# Patient Record
Sex: FEMALE | Race: BLACK OR AFRICAN AMERICAN | Employment: UNEMPLOYED | URBAN - METROPOLITAN AREA
[De-identification: names, ages, dates, MRNs, and addresses within clinical notes are randomized per-mention and may not be internally consistent; named-entity substitution may affect disease eponyms.]

---

## 2017-08-26 PROBLEM — R51.9 HEADACHE: Status: ACTIVE | Noted: 2017-08-26

## 2017-08-26 PROBLEM — M54.50 LOW BACK PAIN: Status: ACTIVE | Noted: 2017-08-26

## 2017-08-26 PROBLEM — R07.9 CHEST PAIN: Status: ACTIVE | Noted: 2017-08-26

## 2021-05-10 ENCOUNTER — OFFICE VISIT (OUTPATIENT)
Dept: SURGERY | Age: 56
End: 2021-05-10
Payer: MEDICARE

## 2021-05-10 VITALS
DIASTOLIC BLOOD PRESSURE: 66 MMHG | OXYGEN SATURATION: 100 % | HEART RATE: 72 BPM | TEMPERATURE: 98.1 F | RESPIRATION RATE: 16 BRPM | SYSTOLIC BLOOD PRESSURE: 107 MMHG | WEIGHT: 247.7 LBS | BODY MASS INDEX: 48.63 KG/M2 | HEIGHT: 60 IN

## 2021-05-10 DIAGNOSIS — E66.01 MORBID OBESITY WITH BMI OF 45.0-49.9, ADULT (HCC): ICD-10-CM

## 2021-05-10 DIAGNOSIS — E78.00 HIGH CHOLESTEROL: ICD-10-CM

## 2021-05-10 DIAGNOSIS — E55.9 VITAMIN D DEFICIENCY: ICD-10-CM

## 2021-05-10 DIAGNOSIS — Z98.84 S/P LAPAROSCOPIC SLEEVE GASTRECTOMY: ICD-10-CM

## 2021-05-10 DIAGNOSIS — E66.8 MODERATE OBESITY: ICD-10-CM

## 2021-05-10 DIAGNOSIS — G25.81 RLS (RESTLESS LEGS SYNDROME): ICD-10-CM

## 2021-05-10 DIAGNOSIS — J45.909 ASTHMA, UNSPECIFIED ASTHMA SEVERITY, UNSPECIFIED WHETHER COMPLICATED, UNSPECIFIED WHETHER PERSISTENT: ICD-10-CM

## 2021-05-10 DIAGNOSIS — M54.50 LOW BACK PAIN, UNSPECIFIED BACK PAIN LATERALITY, UNSPECIFIED CHRONICITY, UNSPECIFIED WHETHER SCIATICA PRESENT: ICD-10-CM

## 2021-05-10 DIAGNOSIS — G43.909 MIGRAINE WITHOUT STATUS MIGRAINOSUS, NOT INTRACTABLE, UNSPECIFIED MIGRAINE TYPE: ICD-10-CM

## 2021-05-10 DIAGNOSIS — I10 ESSENTIAL HYPERTENSION: ICD-10-CM

## 2021-05-10 DIAGNOSIS — K21.9 GASTROESOPHAGEAL REFLUX DISEASE, UNSPECIFIED WHETHER ESOPHAGITIS PRESENT: Primary | ICD-10-CM

## 2021-05-10 DIAGNOSIS — I50.9 HEART FAILURE, UNSPECIFIED HF CHRONICITY, UNSPECIFIED HEART FAILURE TYPE (HCC): ICD-10-CM

## 2021-05-10 DIAGNOSIS — K90.9 INTESTINAL MALABSORPTION, UNSPECIFIED TYPE: ICD-10-CM

## 2021-05-10 PROCEDURE — G8754 DIAS BP LESS 90: HCPCS | Performed by: SPECIALIST

## 2021-05-10 PROCEDURE — 99205 OFFICE O/P NEW HI 60 MIN: CPT | Performed by: SPECIALIST

## 2021-05-10 PROCEDURE — G8427 DOCREV CUR MEDS BY ELIG CLIN: HCPCS | Performed by: SPECIALIST

## 2021-05-10 PROCEDURE — 3017F COLORECTAL CA SCREEN DOC REV: CPT | Performed by: SPECIALIST

## 2021-05-10 PROCEDURE — G8752 SYS BP LESS 140: HCPCS | Performed by: SPECIALIST

## 2021-05-10 PROCEDURE — G8510 SCR DEP NEG, NO PLAN REQD: HCPCS | Performed by: SPECIALIST

## 2021-05-10 PROCEDURE — G8417 CALC BMI ABV UP PARAM F/U: HCPCS | Performed by: SPECIALIST

## 2021-05-10 RX ORDER — PANTOPRAZOLE SODIUM 40 MG/1
40 TABLET, DELAYED RELEASE ORAL 2 TIMES DAILY
COMMUNITY

## 2021-06-09 ENCOUNTER — APPOINTMENT (OUTPATIENT)
Dept: SURGERY | Age: 56
End: 2021-06-09

## 2021-06-09 ENCOUNTER — HOSPITAL ENCOUNTER (OUTPATIENT)
Age: 56
Setting detail: OUTPATIENT SURGERY
Discharge: HOME | End: 2021-06-09
Attending: SPECIALIST | Admitting: SPECIALIST
Payer: MEDICARE

## 2021-06-09 ENCOUNTER — APPOINTMENT (OUTPATIENT)
Dept: GENERAL RADIOLOGY | Age: 56
End: 2021-06-09
Attending: SPECIALIST
Payer: MEDICARE

## 2021-06-09 VITALS
TEMPERATURE: 97.8 F | SYSTOLIC BLOOD PRESSURE: 140 MMHG | HEIGHT: 61 IN | DIASTOLIC BLOOD PRESSURE: 70 MMHG | HEART RATE: 70 BPM | WEIGHT: 254.4 LBS | BODY MASS INDEX: 48.03 KG/M2 | RESPIRATION RATE: 18 BRPM | OXYGEN SATURATION: 100 %

## 2021-06-09 DIAGNOSIS — E66.01 MORBID OBESITY (HCC): ICD-10-CM

## 2021-06-09 DIAGNOSIS — K21.9 GASTROESOPHAGEAL REFLUX DISEASE, UNSPECIFIED WHETHER ESOPHAGITIS PRESENT: ICD-10-CM

## 2021-06-09 PROCEDURE — 74011000250 HC RX REV CODE- 250: Performed by: SPECIALIST

## 2021-06-09 PROCEDURE — 74240 X-RAY XM UPR GI TRC 1CNTRST: CPT

## 2021-06-09 PROCEDURE — 74240 X-RAY XM UPR GI TRC 1CNTRST: CPT | Performed by: SPECIALIST

## 2021-06-09 PROCEDURE — 76040000019: Performed by: SPECIALIST

## 2021-07-20 DIAGNOSIS — K21.9 GASTROESOPHAGEAL REFLUX DISEASE, UNSPECIFIED WHETHER ESOPHAGITIS PRESENT: Primary | ICD-10-CM

## 2021-07-20 DIAGNOSIS — Z98.84 BARIATRIC SURGERY STATUS: ICD-10-CM

## 2021-07-20 DIAGNOSIS — E66.01 MORBID OBESITY (HCC): ICD-10-CM

## 2021-07-20 DIAGNOSIS — Z01.812 BLOOD TESTS PRIOR TO TREATMENT OR PROCEDURE: ICD-10-CM

## 2021-08-04 ENCOUNTER — HOSPITAL ENCOUNTER (OUTPATIENT)
Dept: PREADMISSION TESTING | Age: 56
Discharge: HOME | End: 2021-08-04
Payer: MEDICARE

## 2021-08-04 DIAGNOSIS — E66.01 MORBID OBESITY (HCC): ICD-10-CM

## 2021-08-04 DIAGNOSIS — K21.9 GASTROESOPHAGEAL REFLUX DISEASE, UNSPECIFIED WHETHER ESOPHAGITIS PRESENT: ICD-10-CM

## 2021-08-04 DIAGNOSIS — Z98.84 BARIATRIC SURGERY STATUS: ICD-10-CM

## 2021-08-04 DIAGNOSIS — Z01.812 BLOOD TESTS PRIOR TO TREATMENT OR PROCEDURE: ICD-10-CM

## 2021-08-04 LAB
ALBUMIN SERPL-MCNC: 3.7 G/DL (ref 3.4–5)
ALBUMIN/GLOB SERPL: 1.2 {RATIO} (ref 0.8–1.7)
ALP SERPL-CCNC: 114 U/L (ref 45–117)
ALT SERPL-CCNC: 29 U/L (ref 13–56)
ANION GAP SERPL CALC-SCNC: 1 MMOL/L (ref 3–18)
AST SERPL-CCNC: 22 U/L (ref 10–38)
ATRIAL RATE: 71 BPM
BASOPHILS # BLD: 0 K/UL (ref 0–0.1)
BASOPHILS NFR BLD: 1 % (ref 0–2)
BILIRUB SERPL-MCNC: 0.3 MG/DL (ref 0.2–1)
BUN SERPL-MCNC: 13 MG/DL (ref 7–18)
BUN/CREAT SERPL: 13 (ref 12–20)
CALCIUM SERPL-MCNC: 8.9 MG/DL (ref 8.5–10.1)
CALCULATED P AXIS, ECG09: 82 DEGREES
CALCULATED R AXIS, ECG10: -48 DEGREES
CALCULATED T AXIS, ECG11: 72 DEGREES
CHLORIDE SERPL-SCNC: 111 MMOL/L (ref 100–111)
CO2 SERPL-SCNC: 31 MMOL/L (ref 21–32)
CREAT SERPL-MCNC: 1 MG/DL (ref 0.6–1.3)
DIAGNOSIS, 93000: NORMAL
DIFFERENTIAL METHOD BLD: NORMAL
EOSINOPHIL # BLD: 0.2 K/UL (ref 0–0.4)
EOSINOPHIL NFR BLD: 4 % (ref 0–5)
ERYTHROCYTE [DISTWIDTH] IN BLOOD BY AUTOMATED COUNT: 13 % (ref 11.6–14.5)
EST. AVERAGE GLUCOSE BLD GHB EST-MCNC: 137 MG/DL
GLOBULIN SER CALC-MCNC: 3.2 G/DL (ref 2–4)
GLUCOSE SERPL-MCNC: 89 MG/DL (ref 74–99)
HBA1C MFR BLD: 6.4 % (ref 4.2–5.6)
HCT VFR BLD AUTO: 38.3 % (ref 35–45)
HGB BLD-MCNC: 12 G/DL (ref 12–16)
LYMPHOCYTES # BLD: 2.2 K/UL (ref 0.9–3.6)
LYMPHOCYTES NFR BLD: 41 % (ref 21–52)
MCH RBC QN AUTO: 27.3 PG (ref 24–34)
MCHC RBC AUTO-ENTMCNC: 31.3 G/DL (ref 31–37)
MCV RBC AUTO: 87 FL (ref 74–97)
MONOCYTES # BLD: 0.4 K/UL (ref 0.05–1.2)
MONOCYTES NFR BLD: 8 % (ref 3–10)
NEUTS SEG # BLD: 2.5 K/UL (ref 1.8–8)
NEUTS SEG NFR BLD: 47 % (ref 40–73)
P-R INTERVAL, ECG05: 166 MS
PLATELET # BLD AUTO: 240 K/UL (ref 135–420)
PMV BLD AUTO: 10.4 FL (ref 9.2–11.8)
POTASSIUM SERPL-SCNC: 4.2 MMOL/L (ref 3.5–5.5)
PROT SERPL-MCNC: 6.9 G/DL (ref 6.4–8.2)
Q-T INTERVAL, ECG07: 432 MS
QRS DURATION, ECG06: 90 MS
QTC CALCULATION (BEZET), ECG08: 469 MS
RBC # BLD AUTO: 4.4 M/UL (ref 4.2–5.3)
SODIUM SERPL-SCNC: 143 MMOL/L (ref 136–145)
VENTRICULAR RATE, ECG03: 71 BPM
WBC # BLD AUTO: 5.4 K/UL (ref 4.6–13.2)

## 2021-08-04 PROCEDURE — 93005 ELECTROCARDIOGRAM TRACING: CPT

## 2021-08-04 PROCEDURE — 36415 COLL VENOUS BLD VENIPUNCTURE: CPT

## 2021-08-04 PROCEDURE — 80053 COMPREHEN METABOLIC PANEL: CPT

## 2021-08-04 PROCEDURE — 83036 HEMOGLOBIN GLYCOSYLATED A1C: CPT

## 2021-08-04 PROCEDURE — 85025 COMPLETE CBC W/AUTO DIFF WBC: CPT

## 2021-08-09 ENCOUNTER — TELEPHONE (OUTPATIENT)
Dept: SURGERY | Age: 56
End: 2021-08-09

## 2021-08-09 ENCOUNTER — OFFICE VISIT (OUTPATIENT)
Dept: SURGERY | Age: 56
End: 2021-08-09
Payer: MEDICARE

## 2021-08-09 VITALS
OXYGEN SATURATION: 99 % | HEIGHT: 61 IN | DIASTOLIC BLOOD PRESSURE: 58 MMHG | BODY MASS INDEX: 47.77 KG/M2 | RESPIRATION RATE: 16 BRPM | SYSTOLIC BLOOD PRESSURE: 108 MMHG | WEIGHT: 253 LBS | TEMPERATURE: 97.6 F | HEART RATE: 70 BPM

## 2021-08-09 DIAGNOSIS — G89.18 POST-OP PAIN: Primary | ICD-10-CM

## 2021-08-09 PROCEDURE — G8754 DIAS BP LESS 90: HCPCS | Performed by: SPECIALIST

## 2021-08-09 PROCEDURE — G8417 CALC BMI ABV UP PARAM F/U: HCPCS | Performed by: SPECIALIST

## 2021-08-09 PROCEDURE — G8752 SYS BP LESS 140: HCPCS | Performed by: SPECIALIST

## 2021-08-09 PROCEDURE — G8428 CUR MEDS NOT DOCUMENT: HCPCS | Performed by: SPECIALIST

## 2021-08-09 PROCEDURE — 99215 OFFICE O/P EST HI 40 MIN: CPT | Performed by: SPECIALIST

## 2021-08-09 PROCEDURE — G8510 SCR DEP NEG, NO PLAN REQD: HCPCS | Performed by: SPECIALIST

## 2021-08-09 PROCEDURE — 3017F COLORECTAL CA SCREEN DOC REV: CPT | Performed by: SPECIALIST

## 2021-08-09 RX ORDER — ENOXAPARIN SODIUM 100 MG/ML
40 INJECTION SUBCUTANEOUS EVERY 12 HOURS
Qty: 14 SYRINGE | Refills: 0 | Status: SHIPPED | OUTPATIENT
Start: 2021-08-09 | End: 2021-09-08 | Stop reason: ALTCHOICE

## 2021-08-09 RX ORDER — OXYCODONE AND ACETAMINOPHEN 5; 325 MG/1; MG/1
1 TABLET ORAL
Qty: 30 TABLET | Refills: 0 | Status: SHIPPED | OUTPATIENT
Start: 2021-08-09 | End: 2021-09-08 | Stop reason: ALTCHOICE

## 2021-08-09 RX ORDER — SCOLOPAMINE TRANSDERMAL SYSTEM 1 MG/1
1 PATCH, EXTENDED RELEASE TRANSDERMAL
Qty: 1 PATCH | Refills: 0 | Status: SHIPPED | OUTPATIENT
Start: 2021-08-09 | End: 2021-11-30 | Stop reason: ALTCHOICE

## 2021-08-09 NOTE — TELEPHONE ENCOUNTER
LVM to notify pt that Dr. Raul Nieves sent Rx for Patch to [facility] and pt can  any time before surgery

## 2021-08-09 NOTE — H&P (VIEW-ONLY)
Gastric Bypass - History and Physical    Subjective: The patient is a 60 y.o. obese female with a Body mass index is 47.8 kg/m². .   she presents now to review their work up to date to see if they are a candidate for surgery and whether or not to proceed with the previously requested procedure. Bariatric comorbidities continue to include:   Patient Active Problem List   Diagnosis Code    Low back pain M54.5    Chest pain R07.9    Headache R51.9    Hypertension I10    High cholesterol E78.00    Heart failure (HCC) I50.9    Diabetes ([address] E11.9    Asthma J45.909    Migraines G43.909    GERD (gastroesophageal reflux disease) K21.9    Vitamin D deficiency E55.9    Moderate obesity E66.8    Morbid obesity with BMI of 45.0-49.9, adult (HCC) E66.01, Z68.42    Intestinal malabsorption K90.9    S/P laparoscopic sleeve gastrectomy Z98.84    RLS (restless legs syndrome) G25.81    Morbid obesity ([facility]) E66.01    Morbid obesity with body mass index (BMI) of 40.0 to 49.9 ([facility]) E66.01    History of placement of internal cardiac defibrillator Z95.810       They have been generally well prior to this visit and have had no recent significant illnesses. The patient has had no gastrointestinal issues that would preclude them from proceeding with the surgery they have chosen. Nica Dacosta has recently tried a preoperative weight loss program  in addition to seeing a bariatric nutritionist preoperatively. We have discussed on at least one other occasion about the various types of surgical weight loss procedures and they have considered these options after our initial consultation. We have once again discussed these procedures in detail and they have now decided on a surgical procedure. They present today to discuss this and confirm that their evaluation pre operatively is acceptable to continue with surgery. The patient desires laparoscopic gastric bypass surgery for surgical weight loss.     The patients goal weight is 140lb. These goals are consistent with expected outcomes of their desired operation. her Medical goals are resolution of these health issues. Patient Active Problem List    Diagnosis Date Noted    Morbid obesity ([facility]     Morbid obesity with body mass index (BMI) of 40.0 to 49.9 ([facility]     History of placement of internal cardiac defibrillator     Intestinal malabsorption 05/10/2021    S/P laparoscopic sleeve gastrectomy 05/10/2021    Hypertension     High cholesterol     Diabetes ([facility]     Asthma     Migraines     GERD (gastroesophageal reflux disease)     Vitamin D deficiency     Moderate obesity     Morbid obesity with BMI of 45.0-49.9, adult (HCC)     RLS (restless legs syndrome)     Low back pain 08/26/2017    Chest pain 08/26/2017    Headache 08/26/2017    Heart failure ([facility] 01/2016      Past Surgical History:   Procedure Laterality Date    HX CHOLECYSTECTOMY      HX ORTHOPAEDIC      ganglion cyst    HX PACEMAKER      AICD    CT LAP, CLEMENCIA RESTRICT PROC, LONGITUDINAL GASTRECTOMY  09/2020    with hiatal hernia repair / [place]      Social History     Tobacco Use    Smoking status: Never Smoker    Smokeless tobacco: Never Used   Substance Use Topics    Alcohol use: No      Family History   Problem Relation Age of Onset    Diabetes Mother     Heart Disease Father       Current Outpatient Medications   Medication Sig Dispense Refill    montelukast (SINGULAIR) 10 mg tablet Take 10 mg by mouth nightly.  sacubitriL-valsartan (Entresto)  mg tablet Take 1 Tablet by mouth two (2) times a day.  acetaminophen (TylenoL) 325 mg tablet Take  by mouth every four (4) hours as needed for Pain.  cyanocobalamin, vitamin B-12, (VITAMIN B-12 PO) Take  by mouth.  MAGNESIUM PO Take  by mouth.  biotin 5 mg/mL liqd Take  by mouth.  ferrous sulfate (IRON PO) Take  by mouth.  CALCIUM PO Take  by mouth.       multivitamin (ONE A DAY) tablet Take 1 Tablet by mouth daily.  insulin glargine (Lantus Solostar U-100 Insulin) 100 unit/mL (3 mL) inpn 30 Units by SubCUTAneous route daily.  insulin aspart U-100 (NovoLOG Flexpen U-100 Insulin) 100 unit/mL (3 mL) inpn by SubCUTAneous route Before breakfast, lunch, and dinner. SLIDING SCALE      pantoprazole (Protonix) 40 mg tablet Take 40 mg by mouth two (2) times a day.  ergocalciferol (ERGOCALCIFEROL) 50,000 unit capsule Take 1 Cap by mouth every seven (7) days. 4 Cap 3    rizatriptan (MAXALT) 10 mg tablet Take 10 mg by mouth once as needed for Migraine. May repeat in 2 hours if needed      spironolactone (ALDACTONE) 25 mg tablet Take 50 mg by mouth two (2) times a day.  carvedilol (COREG) 25 mg tablet Take 25 mg by mouth two (2) times daily (with meals).  atorvastatin (LIPITOR) 40 mg tablet Take 40 mg by mouth nightly.  fluticasone-salmeterol (ADVAIR DISKUS) 500-50 mcg/dose diskus inhaler Take 1 Puff by inhalation as needed.  albuterol (ACCUNEB) 1.25 mg/3 mL nebulizer solution Take 3 mL by inhalation every four (4) hours as needed for Wheezing (wheezing). 25 Each 0    albuterol (PROVENTIL, VENTOLIN) 90 mcg/actuation inhaler Take 1-2 Puffs by inhalation every four (4) hours as needed for Wheezing. 17 g 1    ipratropium (ATROVENT) 0.02 % nebulizer solution 2.5 mL by Nebulization route as needed for Wheezing. 2.5 mL 0    FUROSEMIDE PO Take 40 mg by mouth as needed.  fluticasone (FLONASE) 50 mcg/actuation nasal spray 1 [place] by Both Nostrils route nightly.  Cetirizine (ZYRTEC) 10 mg cap Take 10 mg by mouth daily.        No Known Allergies       Review of Systems:          General - No history or complaints of unexpected fever, chills, or weight loss  Head/Neck - No history or complaints of headache, diplopia, dysphagia, hearing loss  Cardiac - No history or complaints of chest pain, palpitations, murmur, or shortness of breath  Pulmonary - No history or complaints of shortness of breath, productive cough, hemoptysis  Gastrointestinal - severe reflux,no  abdominal pain, obstipation/constipation or blood per rectum  Genitourinary - No history or complaints of hematuria/dysuria, stress urinary incontinence symptoms, or renal lithiasis  Musculoskeletal - minimal joint pain ,  no muscular weakness  Hematologic - No history or complaints of bleeding disorders,  No blood transfusions  Neurologic - No history or complaints of  migraine headaches, seizure activity, syncopal episodes, TIA or stroke  Integumentary - No history or complaints of rashes, abnormal nevi, skin cancer  Gynecological - n/a             Objective:     Visit Vitals  BP (!) 108/58 (BP 1 Location: Right arm, BP Patient Position: Sitting, BP Cuff Size: Adult long)   Pulse 70   Temp 97.6 °F (36.4 °C)   Resp 16   Ht 5' 1\" (1.549 m)   Wt 114.8 kg (253 lb)   SpO2 99%   BMI 47.80 kg/m²       Physical Examination: General appearance - alert, well appearing, and in no distress and oriented to person, place, and time  Mental status - alert, oriented to person, place, and time, normal mood, behavior, speech, dress, motor activity, and thought processes  Eyes - pupils equal and reactive, extraocular eye movements intact, sclera anicteric, left eye normal, right eye normal  Ears - right ear normal, left ear normal  Nose - normal and patent, no erythema, discharge or polyps  Mouth - mucous membranes moist, pharynx normal without lesions  Neck - supple, no significant adenopathy  Lymphatics - no palpable lymphadenopathy, no hepatosplenomegaly  Chest - clear to auscultation, no wheezes, rales or rhonchi, symmetric air entry  Heart - normal rate, regular rhythm, normal S1, S2, no murmurs, rubs, clicks or gallops  Abdomen - soft, nontender, nondistended, no masses or organomegaly  Back exam - full range of motion, no tenderness, palpable spasm or pain on motion  Neurological - alert, oriented, normal speech, no focal findings or movement disorder noted  Musculoskeletal - no joint tenderness, deformity or swelling  Extremities - peripheral pulses normal, no pedal edema, no clubbing or cyanosis  Skin - normal coloration and turgor, no rashes, no suspicious skin lesions noted    Labs :     Lab Results   Component Value Date/Time    WBC 5.4 08/04/2021 02:13 PM    HGB 12.0 08/04/2021 02:13 PM    HCT 38.3 08/04/2021 02:13 PM    PLATELET 123 83/27/2495 02:13 PM    MCV 87.0 08/04/2021 02:13 PM     Lab Results   Component Value Date/Time    Sodium 143 08/04/2021 02:13 PM    Potassium 4.2 08/04/2021 02:13 PM    Chloride 111 08/04/2021 02:13 PM    CO2 31 08/04/2021 02:13 PM    Anion gap 1 (L) 08/04/2021 02:13 PM    Glucose 89 08/04/2021 02:13 PM    BUN 13 08/04/2021 02:13 PM    Creatinine 1.00 08/04/2021 02:13 PM    BUN/Creatinine ratio 13 08/04/2021 02:13 PM    GFR est AA >60 08/04/2021 02:13 PM    GFR est non-AA 57 (L) 08/04/2021 02:13 PM    Calcium 8.9 08/04/2021 02:13 PM    Bilirubin, total 0.3 08/04/2021 02:13 PM    Alk. phosphatase 114 08/04/2021 02:13 PM    Protein, total 6.9 08/04/2021 02:13 PM    Albumin 3.7 08/04/2021 02:13 PM    Globulin 3.2 08/04/2021 02:13 PM    A-G Ratio 1.2 08/04/2021 02:13 PM    ALT (SGPT) 29 08/04/2021 02:13 PM     Lab Results   Component Value Date/Time    Iron 60 08/27/2017 04:45 AM    TIBC 347 08/27/2017 04:45 AM    Iron % saturation 17 (L) 08/27/2017 04:45 AM     Lab Results   Component Value Date/Time    Folate 10.9 08/27/2017 04:45 AM     Lab Results   Component Value Date/Time    Vitamin D, 25-OH, Total 10.7 (L) 08/27/2017 09:00 AM               Cardiac / Pulmonary Evaluation:     See Media tab from cardiac work up in May 2021 and Echo from 7/2021    UGI Results:      [facility]     Procedure Report        Christin Maki  MR# [id number]  1965  Date of Service - 6/9/2021     Pre-Op Diagnosis - patient is status post sleeve resection performed in [place] 1.5 years ago with complaint of weight regain and reflux. They now present for UGI to assess their prior anatomy.     Post-Op Diagnosis -same     Procedure - UGI study with barium     Surgeon - Lorraine Mckeon MD     Assistant - None     Complications - None     Specimens - None     Implants - None     Estimate Blood Loss - None     Statement of Medical Necessity - need for UGI evaluation prior to any possible surgical intervention     Procedure - upon ingestion of thin barium she was found to have normal post sleeve anatomy for her timeframe. However, she did have multiple episodes of reflux noted despite her normal anatomy. There was not kinking or twisting effect as contrast flowed easily though her pylorus. Given her BMI of 48 and complaints of significant reflux she would benefit from a conversion to a gastric bypass. Cosigned by: Jones Salguero MD at 06/10/21 2022         Assessment:     Morbid obesity with associated comorbidity    Plan:     laparoscopic gastric bypass surgery    This is a 60 y.o. female with a BMI of Body mass index is 47.8 kg/m². and the weight-related co-morbidties as noted above. Arlette Villeda meets the NIH criteria for bariatric surgery based upon the BMI of Body mass index is 47.8 kg/m². and   multiple weight-related co-morbidties. Arlette Villeda has elected laparoscopic gastric bypass as her intervention of choice for treatment of morbid obestiy through surgical means secondary to its   uniform results,  profound baseline suppression of hunger and pace at which weight is lost.    In the office today, following Elena's history and physical examination, a 40 minute discussion regarding the anatomic alterations for the laparoscopic gastric bypass  was undertaken. The dietary   expectations and the patient  dependent factors for success were thoroughly discussed, to include the need for interval follow-up and long-term dietary changes associated with success.  The possible short   and long term  complications of the gastric bypass were also discussed, to include but not limited to;death, DVT/PE, staple line leak, bleeding, stricture formation, infection,internal hernia  and pouch dilation. Specific weight related outcomes for success were also discussed with an emphasis on careful and close follow-up with the first year and Dietary behavior modification over the first years as baseline cyclical   hunger returns  The patient expressed an understanding of the above factors, and her questions were answered in their entirety. In addition, the patient attended a 1.5 hour power point seminar or online seminar regarding obesity, surgical weight loss including, adjustable gastric band, gastric bypass, and sleeve gastrectomy. This discussion contrasted   the different surgical techniques, mechanisms of actions and expected outcomes, and surgical and medical risks associated with each procedure. During the in office seminar, there was a long question and answer   session where each questions was answered until there were no additional questions. Today, the patient had all of her questions answered and the decision was made today that the patient's preoperative evaluation is acceptable for them  to proceed with bariatric surgery  choosing  gastric bypass as her surgical option. Secondary Diagnoses:     GERD -The patient understands that weight loss surgery is not a guaranteed cure for reflux disease but does understand the benefits that weight loss can have on reflux disease. They also understand that at the time of surgery the gastroesophageal junction will be evaluated for the presence of a diaphragmatic hernia. Hernias will be corrected always with the surgical procedure if possible and is deemed safe. The patient also understands that neither weight loss surgery nor repair of a diaphragmatic hernia repair guarantees the complete cessation of the disease. They also understand there is a possibility of recurrence of these hernias with a simple crural repair as is performed with these procedures. They understand they may have to continue their medications in the postoperative period. They have a good understanding that the gastric bypass procedure is better suited to total resolution of this issue and that neither the Lap Band or sleeve gastrectomy is considered a curative procedure as it pertains to this diagnosis. Adult Onset Diabetes - The patient has petra given a very low carbohydrate diet preoperatively along with instructions to monitor their blood sugars on a regular daily basis. When  their surgery is performed  we will be monitoring the patient with sliding scale insulin and accuchecks.  Based on those values we will determine whether the patient needs a reduction of those medications postoperatively or total removal of those medications on discharge.  We will have the patient continue accuchecks postoperatively while at home also and report to me or their family physician for appropriate adjustments as needed.  The patient also understands that in the event of uncontrolled blood sugar preoperatively that we may choose to postpone their surgery. Hypertension - The patient has a clear understanding of how weight loss improves hypertension as a whole, but also they understand that there is a significant genetic component to this disease process. We will monitor the patients blood pressure while in the hospital and the plan would be to continue those medications postoperatively.  If a diuretic is being used we will stop them on discharge to prevent dehydration particularly with the sleeve gastrectomy and the gastric bypass procedures.  They will be instructed to monitor their blood pressure postoperatively while at home and notify their primary care physician in the event of any significantly high or uncharacteristic readings.     Hyperlipidemia - The patient understands that studies show that almost all patient will realize an improvement in their lipid profile with weight loss that occurs with these procedures. They however also understand that hyperlipidemia is a multifactorial disease particularly as it pertains to their genetic background and that there is no guarantee toward cure  of this issue. We will resume their medications immediately postoperatively as this tends to decrease any post operative cardiac events.  The patient will follow up with their family physician in the postoperative period with plans to repeat their lipid panel 2-3 month postoperative for potential adjustment or removal of these medications. Possible or Existing Coronary Artery Disease - The patient has undergone or will undergo a preoperative evaluation by their cardiologist, or a cardiologist of their choice such that they are deemed   a reasonable candidate for surgery. The patient understands that with a history of cardiac disease that there is always an increased risk compared   to the average patient. Appropriate recommendations have been followed as recommended by the cardiologist.  The patients ASA will be resumed   approximately 1 month postoperatively in a coated form if appropriate. Dr Laila Bradford has cleared her for surgery.             Signed By: Ant Cline MD     August 9, 2021

## 2021-08-09 NOTE — PROGRESS NOTES
Gastric Bypass - History and Physical    Subjective: The patient is a 60 y.o. obese female with a Body mass index is 47.8 kg/m². .   she presents now to review their work up to date to see if they are a candidate for surgery and whether or not to proceed with the previously requested procedure. Bariatric comorbidities continue to include:   Patient Active Problem List   Diagnosis Code    Low back pain M54.5    Chest pain R07.9    Headache R51.9    Hypertension I10    High cholesterol E78.00    Heart failure (HCC) I50.9    Diabetes ([address] E11.9    Asthma J45.909    Migraines G43.909    GERD (gastroesophageal reflux disease) K21.9    Vitamin D deficiency E55.9    Moderate obesity E66.8    Morbid obesity with BMI of 45.0-49.9, adult (HCC) E66.01, Z68.42    Intestinal malabsorption K90.9    S/P laparoscopic sleeve gastrectomy Z98.84    RLS (restless legs syndrome) G25.81    Morbid obesity ([facility]) E66.01    Morbid obesity with body mass index (BMI) of 40.0 to 49.9 ([facility]) E66.01    History of placement of internal cardiac defibrillator Z95.810       They have been generally well prior to this visit and have had no recent significant illnesses. The patient has had no gastrointestinal issues that would preclude them from proceeding with the surgery they have chosen. Joselyn Bermudez has recently tried a preoperative weight loss program  in addition to seeing a bariatric nutritionist preoperatively. We have discussed on at least one other occasion about the various types of surgical weight loss procedures and they have considered these options after our initial consultation. We have once again discussed these procedures in detail and they have now decided on a surgical procedure. They present today to discuss this and confirm that their evaluation pre operatively is acceptable to continue with surgery. The patient desires laparoscopic gastric bypass surgery for surgical weight loss.     The patients goal weight is 140lb. These goals are consistent with expected outcomes of their desired operation. her Medical goals are resolution of these health issues. Patient Active Problem List    Diagnosis Date Noted    Morbid obesity ([facility]     Morbid obesity with body mass index (BMI) of 40.0 to 49.9 ([facility]     History of placement of internal cardiac defibrillator     Intestinal malabsorption 05/10/2021    S/P laparoscopic sleeve gastrectomy 05/10/2021    Hypertension     High cholesterol     Diabetes ([facility]     Asthma     Migraines     GERD (gastroesophageal reflux disease)     Vitamin D deficiency     Moderate obesity     Morbid obesity with BMI of 45.0-49.9, adult (HCC)     RLS (restless legs syndrome)     Low back pain 08/26/2017    Chest pain 08/26/2017    Headache 08/26/2017    Heart failure ([facility] 01/2016      Past Surgical History:   Procedure Laterality Date    HX CHOLECYSTECTOMY      HX ORTHOPAEDIC      ganglion cyst    HX PACEMAKER      AICD    MO LAP, CLEMENCIA RESTRICT PROC, LONGITUDINAL GASTRECTOMY  09/2020    with hiatal hernia repair / [place]      Social History     Tobacco Use    Smoking status: Never Smoker    Smokeless tobacco: Never Used   Substance Use Topics    Alcohol use: No      Family History   Problem Relation Age of Onset    Diabetes Mother     Heart Disease Father       Current Outpatient Medications   Medication Sig Dispense Refill    montelukast (SINGULAIR) 10 mg tablet Take 10 mg by mouth nightly.  sacubitriL-valsartan (Entresto)  mg tablet Take 1 Tablet by mouth two (2) times a day.  acetaminophen (TylenoL) 325 mg tablet Take  by mouth every four (4) hours as needed for Pain.  cyanocobalamin, vitamin B-12, (VITAMIN B-12 PO) Take  by mouth.  MAGNESIUM PO Take  by mouth.  biotin 5 mg/mL liqd Take  by mouth.  ferrous sulfate (IRON PO) Take  by mouth.  CALCIUM PO Take  by mouth.       multivitamin (ONE A DAY) tablet Take 1 Tablet by mouth daily.  insulin glargine (Lantus Solostar U-100 Insulin) 100 unit/mL (3 mL) inpn 30 Units by SubCUTAneous route daily.  insulin aspart U-100 (NovoLOG Flexpen U-100 Insulin) 100 unit/mL (3 mL) inpn by SubCUTAneous route Before breakfast, lunch, and dinner. SLIDING SCALE      pantoprazole (Protonix) 40 mg tablet Take 40 mg by mouth two (2) times a day.  ergocalciferol (ERGOCALCIFEROL) 50,000 unit capsule Take 1 Cap by mouth every seven (7) days. 4 Cap 3    rizatriptan (MAXALT) 10 mg tablet Take 10 mg by mouth once as needed for Migraine. May repeat in 2 hours if needed      spironolactone (ALDACTONE) 25 mg tablet Take 50 mg by mouth two (2) times a day.  carvedilol (COREG) 25 mg tablet Take 25 mg by mouth two (2) times daily (with meals).  atorvastatin (LIPITOR) 40 mg tablet Take 40 mg by mouth nightly.  fluticasone-salmeterol (ADVAIR DISKUS) 500-50 mcg/dose diskus inhaler Take 1 Puff by inhalation as needed.  albuterol (ACCUNEB) 1.25 mg/3 mL nebulizer solution Take 3 mL by inhalation every four (4) hours as needed for Wheezing (wheezing). 25 Each 0    albuterol (PROVENTIL, VENTOLIN) 90 mcg/actuation inhaler Take 1-2 Puffs by inhalation every four (4) hours as needed for Wheezing. 17 g 1    ipratropium (ATROVENT) 0.02 % nebulizer solution 2.5 mL by Nebulization route as needed for Wheezing. 2.5 mL 0    FUROSEMIDE PO Take 40 mg by mouth as needed.  fluticasone (FLONASE) 50 mcg/actuation nasal spray 1 [place] by Both Nostrils route nightly.  Cetirizine (ZYRTEC) 10 mg cap Take 10 mg by mouth daily.        No Known Allergies       Review of Systems:          General - No history or complaints of unexpected fever, chills, or weight loss  Head/Neck - No history or complaints of headache, diplopia, dysphagia, hearing loss  Cardiac - No history or complaints of chest pain, palpitations, murmur, or shortness of breath  Pulmonary - No history or complaints of shortness of breath, productive cough, hemoptysis  Gastrointestinal - severe reflux,no  abdominal pain, obstipation/constipation or blood per rectum  Genitourinary - No history or complaints of hematuria/dysuria, stress urinary incontinence symptoms, or renal lithiasis  Musculoskeletal - minimal joint pain ,  no muscular weakness  Hematologic - No history or complaints of bleeding disorders,  No blood transfusions  Neurologic - No history or complaints of  migraine headaches, seizure activity, syncopal episodes, TIA or stroke  Integumentary - No history or complaints of rashes, abnormal nevi, skin cancer  Gynecological - n/a             Objective:     Visit Vitals  BP (!) 108/58 (BP 1 Location: Right arm, BP Patient Position: Sitting, BP Cuff Size: Adult long)   Pulse 70   Temp 97.6 °F (36.4 °C)   Resp 16   Ht 5' 1\" (1.549 m)   Wt 114.8 kg (253 lb)   SpO2 99%   BMI 47.80 kg/m²       Physical Examination: General appearance - alert, well appearing, and in no distress and oriented to person, place, and time  Mental status - alert, oriented to person, place, and time, normal mood, behavior, speech, dress, motor activity, and thought processes  Eyes - pupils equal and reactive, extraocular eye movements intact, sclera anicteric, left eye normal, right eye normal  Ears - right ear normal, left ear normal  Nose - normal and patent, no erythema, discharge or polyps  Mouth - mucous membranes moist, pharynx normal without lesions  Neck - supple, no significant adenopathy  Lymphatics - no palpable lymphadenopathy, no hepatosplenomegaly  Chest - clear to auscultation, no wheezes, rales or rhonchi, symmetric air entry  Heart - normal rate, regular rhythm, normal S1, S2, no murmurs, rubs, clicks or gallops  Abdomen - soft, nontender, nondistended, no masses or organomegaly  Back exam - full range of motion, no tenderness, palpable spasm or pain on motion  Neurological - alert, oriented, normal speech, no focal findings or movement disorder noted  Musculoskeletal - no joint tenderness, deformity or swelling  Extremities - peripheral pulses normal, no pedal edema, no clubbing or cyanosis  Skin - normal coloration and turgor, no rashes, no suspicious skin lesions noted    Labs :     Lab Results   Component Value Date/Time    WBC 5.4 08/04/2021 02:13 PM    HGB 12.0 08/04/2021 02:13 PM    HCT 38.3 08/04/2021 02:13 PM    PLATELET 259 30/33/6901 02:13 PM    MCV 87.0 08/04/2021 02:13 PM     Lab Results   Component Value Date/Time    Sodium 143 08/04/2021 02:13 PM    Potassium 4.2 08/04/2021 02:13 PM    Chloride 111 08/04/2021 02:13 PM    CO2 31 08/04/2021 02:13 PM    Anion gap 1 (L) 08/04/2021 02:13 PM    Glucose 89 08/04/2021 02:13 PM    BUN 13 08/04/2021 02:13 PM    Creatinine 1.00 08/04/2021 02:13 PM    BUN/Creatinine ratio 13 08/04/2021 02:13 PM    GFR est AA >60 08/04/2021 02:13 PM    GFR est non-AA 57 (L) 08/04/2021 02:13 PM    Calcium 8.9 08/04/2021 02:13 PM    Bilirubin, total 0.3 08/04/2021 02:13 PM    Alk. phosphatase 114 08/04/2021 02:13 PM    Protein, total 6.9 08/04/2021 02:13 PM    Albumin 3.7 08/04/2021 02:13 PM    Globulin 3.2 08/04/2021 02:13 PM    A-G Ratio 1.2 08/04/2021 02:13 PM    ALT (SGPT) 29 08/04/2021 02:13 PM     Lab Results   Component Value Date/Time    Iron 60 08/27/2017 04:45 AM    TIBC 347 08/27/2017 04:45 AM    Iron % saturation 17 (L) 08/27/2017 04:45 AM     Lab Results   Component Value Date/Time    Folate 10.9 08/27/2017 04:45 AM     Lab Results   Component Value Date/Time    Vitamin D, 25-OH, Total 10.7 (L) 08/27/2017 09:00 AM               Cardiac / Pulmonary Evaluation:     See Media tab from cardiac work up in May 2021 and Echo from 7/2021    UGI Results:      [facility]     Procedure Report        Arlene Spann  MR# [id number]  1965  Date of Service - 6/9/2021     Pre-Op Diagnosis - patient is status post sleeve resection performed in [place] 1.5 years ago with complaint of weight regain and reflux. They now present for UGI to assess their prior anatomy.     Post-Op Diagnosis -same     Procedure - UGI study with barium     Surgeon - Renald Closs MD     Assistant - None     Complications - None     Specimens - None     Implants - None     Estimate Blood Loss - None     Statement of Medical Necessity - need for UGI evaluation prior to any possible surgical intervention     Procedure - upon ingestion of thin barium she was found to have normal post sleeve anatomy for her timeframe. However, she did have multiple episodes of reflux noted despite her normal anatomy. There was not kinking or twisting effect as contrast flowed easily though her pylorus. Given her BMI of 48 and complaints of significant reflux she would benefit from a conversion to a gastric bypass. Cosigned by: Aravind Rice MD at 06/10/21 7626         Assessment:     Morbid obesity with associated comorbidity    Plan:     laparoscopic gastric bypass surgery    This is a 60 y.o. female with a BMI of Body mass index is 47.8 kg/m². and the weight-related co-morbidties as noted above. Maye Epps meets the NIH criteria for bariatric surgery based upon the BMI of Body mass index is 47.8 kg/m². and   multiple weight-related co-morbidties. Maye Epps has elected laparoscopic gastric bypass as her intervention of choice for treatment of morbid obestiy through surgical means secondary to its   uniform results,  profound baseline suppression of hunger and pace at which weight is lost.    In the office today, following Elena's history and physical examination, a 40 minute discussion regarding the anatomic alterations for the laparoscopic gastric bypass  was undertaken. The dietary   expectations and the patient  dependent factors for success were thoroughly discussed, to include the need for interval follow-up and long-term dietary changes associated with success.  The possible short   and long term  complications of the gastric bypass were also discussed, to include but not limited to;death, DVT/PE, staple line leak, bleeding, stricture formation, infection,internal hernia  and pouch dilation. Specific weight related outcomes for success were also discussed with an emphasis on careful and close follow-up with the first year and Dietary behavior modification over the first years as baseline cyclical   hunger returns  The patient expressed an understanding of the above factors, and her questions were answered in their entirety. In addition, the patient attended a 1.5 hour power point seminar or online seminar regarding obesity, surgical weight loss including, adjustable gastric band, gastric bypass, and sleeve gastrectomy. This discussion contrasted   the different surgical techniques, mechanisms of actions and expected outcomes, and surgical and medical risks associated with each procedure. During the in office seminar, there was a long question and answer   session where each questions was answered until there were no additional questions. Today, the patient had all of her questions answered and the decision was made today that the patient's preoperative evaluation is acceptable for them  to proceed with bariatric surgery  choosing  gastric bypass as her surgical option. Secondary Diagnoses:     GERD -The patient understands that weight loss surgery is not a guaranteed cure for reflux disease but does understand the benefits that weight loss can have on reflux disease. They also understand that at the time of surgery the gastroesophageal junction will be evaluated for the presence of a diaphragmatic hernia. Hernias will be corrected always with the surgical procedure if possible and is deemed safe. The patient also understands that neither weight loss surgery nor repair of a diaphragmatic hernia repair guarantees the complete cessation of the disease. They also understand there is a possibility of recurrence of these hernias with a simple crural repair as is performed with these procedures. They understand they may have to continue their medications in the postoperative period. They have a good understanding that the gastric bypass procedure is better suited to total resolution of this issue and that neither the Lap Band or sleeve gastrectomy is considered a curative procedure as it pertains to this diagnosis. Adult Onset Diabetes - The patient has petra given a very low carbohydrate diet preoperatively along with instructions to monitor their blood sugars on a regular daily basis. When  their surgery is performed  we will be monitoring the patient with sliding scale insulin and accuchecks.  Based on those values we will determine whether the patient needs a reduction of those medications postoperatively or total removal of those medications on discharge.  We will have the patient continue accuchecks postoperatively while at home also and report to me or their family physician for appropriate adjustments as needed.  The patient also understands that in the event of uncontrolled blood sugar preoperatively that we may choose to postpone their surgery. Hypertension - The patient has a clear understanding of how weight loss improves hypertension as a whole, but also they understand that there is a significant genetic component to this disease process. We will monitor the patients blood pressure while in the hospital and the plan would be to continue those medications postoperatively.  If a diuretic is being used we will stop them on discharge to prevent dehydration particularly with the sleeve gastrectomy and the gastric bypass procedures.  They will be instructed to monitor their blood pressure postoperatively while at home and notify their primary care physician in the event of any significantly high or uncharacteristic readings.     Hyperlipidemia - The patient understands that studies show that almost all patient will realize an improvement in their lipid profile with weight loss that occurs with these procedures. They however also understand that hyperlipidemia is a multifactorial disease particularly as it pertains to their genetic background and that there is no guarantee toward cure  of this issue. We will resume their medications immediately postoperatively as this tends to decrease any post operative cardiac events.  The patient will follow up with their family physician in the postoperative period with plans to repeat their lipid panel 2-3 month postoperative for potential adjustment or removal of these medications. Possible or Existing Coronary Artery Disease - The patient has undergone or will undergo a preoperative evaluation by their cardiologist, or a cardiologist of their choice such that they are deemed   a reasonable candidate for surgery. The patient understands that with a history of cardiac disease that there is always an increased risk compared   to the average patient. Appropriate recommendations have been followed as recommended by the cardiologist.  The patients ASA will be resumed   approximately 1 month postoperatively in a coated form if appropriate. Dr Cachorro Damon has cleared her for surgery.             Signed By: Tacos Villalobos MD     August 9, 2021

## 2021-08-20 ENCOUNTER — HOSPITAL ENCOUNTER (OUTPATIENT)
Dept: PREADMISSION TESTING | Age: 56
Discharge: HOME | End: 2021-08-20
Payer: MEDICARE

## 2021-08-20 PROCEDURE — U0003 INFECTIOUS AGENT DETECTION BY NUCLEIC ACID (DNA OR RNA); SEVERE ACUTE RESPIRATORY SYNDROME CORONAVIRUS 2 (SARS-COV-2) (CORONAVIRUS DISEASE [COVID-19]), AMPLIFIED PROBE TECHNIQUE, MAKING USE OF HIGH THROUGHPUT TECHNOLOGIES AS DESCRIBED BY CMS-2020-01-R: HCPCS

## 2021-08-21 LAB — SARS-COV-2, COV2NT: NOT DETECTED

## 2021-08-23 ENCOUNTER — TELEPHONE (OUTPATIENT)
Dept: SURGERY | Age: 56
End: 2021-08-23

## 2021-08-23 ENCOUNTER — ANESTHESIA EVENT (OUTPATIENT)
Dept: SURGERY | Age: 56
DRG: 621 | End: 2021-08-23
Payer: MEDICARE

## 2021-08-23 RX ORDER — MAGNESIUM SULFATE 100 %
16 CRYSTALS MISCELLANEOUS AS NEEDED
Status: CANCELLED | OUTPATIENT
Start: 2021-08-23

## 2021-08-23 RX ORDER — DEXTROSE 50 % IN WATER (D50W) INTRAVENOUS SYRINGE
25 AS NEEDED
Status: CANCELLED | OUTPATIENT
Start: 2021-08-23

## 2021-08-23 RX ORDER — INSULIN LISPRO 100 [IU]/ML
INJECTION, SOLUTION INTRAVENOUS; SUBCUTANEOUS ONCE
Status: CANCELLED | OUTPATIENT
Start: 2021-08-23 | End: 2021-08-23

## 2021-08-24 ENCOUNTER — ANESTHESIA (OUTPATIENT)
Dept: SURGERY | Age: 56
DRG: 621 | End: 2021-08-24
Payer: MEDICARE

## 2021-08-24 ENCOUNTER — HOSPITAL ENCOUNTER (INPATIENT)
Age: 56
LOS: 1 days | Discharge: HOME | DRG: 621 | End: 2021-08-25
Attending: SPECIALIST | Admitting: SPECIALIST
Payer: MEDICARE

## 2021-08-24 DIAGNOSIS — E66.01 MORBID OBESITY (HCC): ICD-10-CM

## 2021-08-24 DIAGNOSIS — K21.9 GASTROESOPHAGEAL REFLUX DISEASE, UNSPECIFIED WHETHER ESOPHAGITIS PRESENT: ICD-10-CM

## 2021-08-24 LAB
ABO + RH BLD: NORMAL
BLOOD GROUP ANTIBODIES SERPL: NORMAL
GLUCOSE BLD STRIP.AUTO-MCNC: 127 MG/DL (ref 70–110)
GLUCOSE BLD STRIP.AUTO-MCNC: 153 MG/DL (ref 70–110)
GLUCOSE BLD STRIP.AUTO-MCNC: 163 MG/DL (ref 70–110)
GLUCOSE BLD STRIP.AUTO-MCNC: 90 MG/DL (ref 70–110)
SPECIMEN EXP DATE BLD: NORMAL

## 2021-08-24 PROCEDURE — 77030008603 HC TRCR ENDOSC EPATH J&J -C: Performed by: SPECIALIST

## 2021-08-24 PROCEDURE — 77030002986 HC SUT PROL J&J -A: Performed by: SPECIALIST

## 2021-08-24 PROCEDURE — 77030009967 HC RELD STPLR ENDOSC J&J -C: Performed by: SPECIALIST

## 2021-08-24 PROCEDURE — 77030014008 HC SPNG HEMSTAT J&J -C: Performed by: SPECIALIST

## 2021-08-24 PROCEDURE — 77030008518 HC TBNG INSUF ENDO STRY -B: Performed by: SPECIALIST

## 2021-08-24 PROCEDURE — 77030027876 HC STPLR ENDOSC FLX PWR J&J -G1: Performed by: SPECIALIST

## 2021-08-24 PROCEDURE — 77030003580 HC NDL INSUF VERES J&J -B: Performed by: SPECIALIST

## 2021-08-24 PROCEDURE — 77030020269 HC MISC IMPL: Performed by: SPECIALIST

## 2021-08-24 PROCEDURE — 77030040506 HC DRN WND MDII -A: Performed by: SPECIALIST

## 2021-08-24 PROCEDURE — 76060000035 HC ANESTHESIA 2 TO 2.5 HR: Performed by: SPECIALIST

## 2021-08-24 PROCEDURE — 77030008574 HC TBNG SUC IRR STRY -B: Performed by: SPECIALIST

## 2021-08-24 PROCEDURE — 77030010515 HC APPL ENDOCLP LIG J&J -B: Performed by: SPECIALIST

## 2021-08-24 PROCEDURE — 74011250636 HC RX REV CODE- 250/636: Performed by: SPECIALIST

## 2021-08-24 PROCEDURE — 77030009968 HC RELD STPLR ENDOSC J&J -D: Performed by: SPECIALIST

## 2021-08-24 PROCEDURE — 77030002912 HC SUT ETHBND J&J -A: Performed by: SPECIALIST

## 2021-08-24 PROCEDURE — 65270000029 HC RM PRIVATE

## 2021-08-24 PROCEDURE — 77030020407 HC IV BLD WRMR ST 3M -A: Performed by: SPECIALIST

## 2021-08-24 PROCEDURE — 77030002933 HC SUT MCRYL J&J -A: Performed by: SPECIALIST

## 2021-08-24 PROCEDURE — 88305 TISSUE EXAM BY PATHOLOGIST: CPT

## 2021-08-24 PROCEDURE — 77030008683 HC TU ET CUF COVD -A: Performed by: SPECIALIST

## 2021-08-24 PROCEDURE — 77030040361 HC SLV COMPR DVT MDII -B: Performed by: SPECIALIST

## 2021-08-24 PROCEDURE — 77030008477 HC STYL SATN SLP COVD -A: Performed by: SPECIALIST

## 2021-08-24 PROCEDURE — 43644 LAP GASTRIC BYPASS/ROUX-EN-Y: CPT | Performed by: SPECIALIST

## 2021-08-24 PROCEDURE — 36415 COLL VENOUS BLD VENIPUNCTURE: CPT

## 2021-08-24 PROCEDURE — 77030025303 HC STPLR ENDOSC J&J -G: Performed by: SPECIALIST

## 2021-08-24 PROCEDURE — 77030009851 HC PCH RTVR ENDOSC AMR -B: Performed by: SPECIALIST

## 2021-08-24 PROCEDURE — 77030027138 HC INCENT SPIROMETER -A: Performed by: SPECIALIST

## 2021-08-24 PROCEDURE — 77030008602 HC TRCR ENDOSC EPATH J&J -B: Performed by: SPECIALIST

## 2021-08-24 PROCEDURE — 47379 UNLISTED LAPS PX LIVER: CPT | Performed by: SPECIALIST

## 2021-08-24 PROCEDURE — 0FB20ZX EXCISION OF LEFT LOBE LIVER, OPEN APPROACH, DIAGNOSTIC: ICD-10-PCS | Performed by: SPECIALIST

## 2021-08-24 PROCEDURE — 76010000131 HC OR TIME 2 TO 2.5 HR: Performed by: SPECIALIST

## 2021-08-24 PROCEDURE — 77030002896 HC SUT CLP ABSRB J&J -B: Performed by: SPECIALIST

## 2021-08-24 PROCEDURE — 77030039961 HC KT CUST COLON BSC -D: Performed by: SPECIALIST

## 2021-08-24 PROCEDURE — 86901 BLOOD TYPING SEROLOGIC RH(D): CPT

## 2021-08-24 PROCEDURE — 76210000017 HC OR PH I REC 1.5 TO 2 HR: Performed by: SPECIALIST

## 2021-08-24 PROCEDURE — 74011000250 HC RX REV CODE- 250: Performed by: SPECIALIST

## 2021-08-24 PROCEDURE — 77030041458 HC SEALNT FIBRN VISTASEAL J&J -G: Performed by: SPECIALIST

## 2021-08-24 PROCEDURE — 77030002916 HC SUT ETHLN J&J -A: Performed by: SPECIALIST

## 2021-08-24 PROCEDURE — 2709999900 HC NON-CHARGEABLE SUPPLY: Performed by: SPECIALIST

## 2021-08-24 PROCEDURE — 0DQV0ZZ REPAIR MESENTERY, OPEN APPROACH: ICD-10-PCS | Performed by: SPECIALIST

## 2021-08-24 PROCEDURE — 82962 GLUCOSE BLOOD TEST: CPT

## 2021-08-24 PROCEDURE — 0D164ZA BYPASS STOMACH TO JEJUNUM, PERCUTANEOUS ENDOSCOPIC APPROACH: ICD-10-PCS | Performed by: SPECIALIST

## 2021-08-24 PROCEDURE — 77030016151 HC PROTCTR LNS DFOG COVD -B: Performed by: SPECIALIST

## 2021-08-24 PROCEDURE — 77030041460 HC APL VISTASEAL J&J -B: Performed by: SPECIALIST

## 2021-08-24 PROCEDURE — 77030034154 HC SHR COAG HARM ACE J&J -F: Performed by: SPECIALIST

## 2021-08-24 PROCEDURE — 88313 SPECIAL STAINS GROUP 2: CPT

## 2021-08-24 PROCEDURE — 0DB64Z3 EXCISION OF STOMACH, PERCUTANEOUS ENDOSCOPIC APPROACH, VERTICAL: ICD-10-PCS | Performed by: SPECIALIST

## 2021-08-24 PROCEDURE — 74011250637 HC RX REV CODE- 250/637: Performed by: SPECIALIST

## 2021-08-24 PROCEDURE — 88307 TISSUE EXAM BY PATHOLOGIST: CPT

## 2021-08-24 PROCEDURE — 77030012893: Performed by: SPECIALIST

## 2021-08-24 PROCEDURE — 77030036732 HC RELD STPLR VASC J&J -F: Performed by: SPECIALIST

## 2021-08-24 PROCEDURE — 77030010030: Performed by: SPECIALIST

## 2021-08-24 PROCEDURE — 77010033678 HC OXYGEN DAILY

## 2021-08-24 PROCEDURE — 77030009426 HC FCPS BIOP ENDOSC BSC -B: Performed by: SPECIALIST

## 2021-08-24 PROCEDURE — 77030002966 HC SUT PDS J&J -A: Performed by: SPECIALIST

## 2021-08-24 PROCEDURE — 77030005513 HC CATH URETH FOL11 MDII -B: Performed by: SPECIALIST

## 2021-08-24 PROCEDURE — 74011636637 HC RX REV CODE- 636/637: Performed by: SPECIALIST

## 2021-08-24 DEVICE — ECHELON 60MM REINFORCEMENT
Type: IMPLANTABLE DEVICE | Site: STOMACH | Status: FUNCTIONAL
Brand: ECHLEON

## 2021-08-24 RX ORDER — METOCLOPRAMIDE HYDROCHLORIDE 5 MG/ML
10 INJECTION INTRAMUSCULAR; INTRAVENOUS EVERY 6 HOURS
Status: DISCONTINUED | OUTPATIENT
Start: 2021-08-24 | End: 2021-08-25 | Stop reason: HOSPADM

## 2021-08-24 RX ORDER — KETAMINE HCL IN 0.9 % NACL 50 MG/5 ML
SYRINGE (ML) INTRAVENOUS AS NEEDED
Status: DISCONTINUED | OUTPATIENT
Start: 2021-08-24 | End: 2021-08-24 | Stop reason: HOSPADM

## 2021-08-24 RX ORDER — MAGNESIUM SULFATE 100 %
4 CRYSTALS MISCELLANEOUS AS NEEDED
Status: DISCONTINUED | OUTPATIENT
Start: 2021-08-24 | End: 2021-08-24 | Stop reason: HOSPADM

## 2021-08-24 RX ORDER — ENOXAPARIN SODIUM 100 MG/ML
40 INJECTION SUBCUTANEOUS ONCE
Status: COMPLETED | OUTPATIENT
Start: 2021-08-24 | End: 2021-08-24

## 2021-08-24 RX ORDER — MAGNESIUM SULFATE 100 %
4 CRYSTALS MISCELLANEOUS AS NEEDED
Status: DISCONTINUED | OUTPATIENT
Start: 2021-08-24 | End: 2021-08-25 | Stop reason: HOSPADM

## 2021-08-24 RX ORDER — ONDANSETRON 2 MG/ML
4 INJECTION INTRAMUSCULAR; INTRAVENOUS
Status: DISCONTINUED | OUTPATIENT
Start: 2021-08-24 | End: 2021-08-25 | Stop reason: HOSPADM

## 2021-08-24 RX ORDER — ENOXAPARIN SODIUM 100 MG/ML
40 INJECTION SUBCUTANEOUS EVERY 12 HOURS
Status: DISCONTINUED | OUTPATIENT
Start: 2021-08-24 | End: 2021-08-25 | Stop reason: HOSPADM

## 2021-08-24 RX ORDER — LEVALBUTEROL INHALATION SOLUTION 0.63 MG/3ML
0.63 SOLUTION RESPIRATORY (INHALATION) ONCE
Status: COMPLETED | OUTPATIENT
Start: 2021-08-24 | End: 2021-08-24

## 2021-08-24 RX ORDER — NEOSTIGMINE METHYLSULFATE 1 MG/ML
INJECTION, SOLUTION INTRAVENOUS AS NEEDED
Status: DISCONTINUED | OUTPATIENT
Start: 2021-08-24 | End: 2021-08-24 | Stop reason: HOSPADM

## 2021-08-24 RX ORDER — ONDANSETRON 2 MG/ML
INJECTION INTRAMUSCULAR; INTRAVENOUS AS NEEDED
Status: DISCONTINUED | OUTPATIENT
Start: 2021-08-24 | End: 2021-08-24 | Stop reason: HOSPADM

## 2021-08-24 RX ORDER — ROCURONIUM BROMIDE 10 MG/ML
INJECTION, SOLUTION INTRAVENOUS AS NEEDED
Status: DISCONTINUED | OUTPATIENT
Start: 2021-08-24 | End: 2021-08-24 | Stop reason: HOSPADM

## 2021-08-24 RX ORDER — HYDROMORPHONE HYDROCHLORIDE 1 MG/ML
0.2 INJECTION, SOLUTION INTRAMUSCULAR; INTRAVENOUS; SUBCUTANEOUS
Status: DISCONTINUED | OUTPATIENT
Start: 2021-08-24 | End: 2021-08-24 | Stop reason: HOSPADM

## 2021-08-24 RX ORDER — NALOXONE HYDROCHLORIDE 0.4 MG/ML
0.4 INJECTION, SOLUTION INTRAMUSCULAR; INTRAVENOUS; SUBCUTANEOUS AS NEEDED
Status: DISCONTINUED | OUTPATIENT
Start: 2021-08-24 | End: 2021-08-25 | Stop reason: HOSPADM

## 2021-08-24 RX ORDER — CEFAZOLIN SODIUM/WATER 2 G/20 ML
2 SYRINGE (ML) INTRAVENOUS EVERY 8 HOURS
Status: COMPLETED | OUTPATIENT
Start: 2021-08-24 | End: 2021-08-25

## 2021-08-24 RX ORDER — ACETAMINOPHEN 500 MG
1000 TABLET ORAL ONCE
Status: COMPLETED | OUTPATIENT
Start: 2021-08-24 | End: 2021-08-24

## 2021-08-24 RX ORDER — SODIUM CHLORIDE, SODIUM LACTATE, POTASSIUM CHLORIDE, CALCIUM CHLORIDE 600; 310; 30; 20 MG/100ML; MG/100ML; MG/100ML; MG/100ML
100 INJECTION, SOLUTION INTRAVENOUS CONTINUOUS
Status: DISCONTINUED | OUTPATIENT
Start: 2021-08-24 | End: 2021-08-25 | Stop reason: HOSPADM

## 2021-08-24 RX ORDER — FENTANYL CITRATE 50 UG/ML
25 INJECTION, SOLUTION INTRAMUSCULAR; INTRAVENOUS AS NEEDED
Status: DISCONTINUED | OUTPATIENT
Start: 2021-08-24 | End: 2021-08-24 | Stop reason: HOSPADM

## 2021-08-24 RX ORDER — DEXTROSE 50 % IN WATER (D50W) INTRAVENOUS SYRINGE
25-50 AS NEEDED
Status: DISCONTINUED | OUTPATIENT
Start: 2021-08-24 | End: 2021-08-25 | Stop reason: HOSPADM

## 2021-08-24 RX ORDER — FAMOTIDINE 20 MG/50ML
20 INJECTION, SOLUTION INTRAVENOUS ONCE
Status: DISCONTINUED | OUTPATIENT
Start: 2021-08-24 | End: 2021-08-24 | Stop reason: RX

## 2021-08-24 RX ORDER — ONDANSETRON 2 MG/ML
4 INJECTION INTRAMUSCULAR; INTRAVENOUS ONCE
Status: DISCONTINUED | OUTPATIENT
Start: 2021-08-24 | End: 2021-08-24 | Stop reason: HOSPADM

## 2021-08-24 RX ORDER — DEXTROSE 50 % IN WATER (D50W) INTRAVENOUS SYRINGE
25-50 AS NEEDED
Status: DISCONTINUED | OUTPATIENT
Start: 2021-08-24 | End: 2021-08-24 | Stop reason: HOSPADM

## 2021-08-24 RX ORDER — NYSTATIN 100000 [USP'U]/ML
500000 SUSPENSION ORAL
Status: COMPLETED | OUTPATIENT
Start: 2021-08-24 | End: 2021-08-24

## 2021-08-24 RX ORDER — KETOROLAC TROMETHAMINE 30 MG/ML
15 INJECTION, SOLUTION INTRAMUSCULAR; INTRAVENOUS EVERY 6 HOURS
Status: DISCONTINUED | OUTPATIENT
Start: 2021-08-24 | End: 2021-08-25 | Stop reason: HOSPADM

## 2021-08-24 RX ORDER — SCOLOPAMINE TRANSDERMAL SYSTEM 1 MG/1
1 PATCH, EXTENDED RELEASE TRANSDERMAL ONCE
Status: DISPENSED | OUTPATIENT
Start: 2021-08-24 | End: 2021-08-24

## 2021-08-24 RX ORDER — LABETALOL HYDROCHLORIDE 5 MG/ML
5 INJECTION, SOLUTION INTRAVENOUS
Status: DISCONTINUED | OUTPATIENT
Start: 2021-08-24 | End: 2021-08-24 | Stop reason: HOSPADM

## 2021-08-24 RX ORDER — SODIUM CHLORIDE 9 MG/ML
125 INJECTION, SOLUTION INTRAVENOUS CONTINUOUS
Status: DISCONTINUED | OUTPATIENT
Start: 2021-08-24 | End: 2021-08-24 | Stop reason: HOSPADM

## 2021-08-24 RX ORDER — CEFAZOLIN SODIUM/WATER 2 G/20 ML
2 SYRINGE (ML) INTRAVENOUS ONCE
Status: COMPLETED | OUTPATIENT
Start: 2021-08-24 | End: 2021-08-24

## 2021-08-24 RX ORDER — LIDOCAINE HYDROCHLORIDE 20 MG/ML
INJECTION, SOLUTION EPIDURAL; INFILTRATION; INTRACAUDAL; PERINEURAL AS NEEDED
Status: DISCONTINUED | OUTPATIENT
Start: 2021-08-24 | End: 2021-08-24 | Stop reason: HOSPADM

## 2021-08-24 RX ORDER — FENTANYL CITRATE 50 UG/ML
50 INJECTION, SOLUTION INTRAMUSCULAR; INTRAVENOUS
Status: DISCONTINUED | OUTPATIENT
Start: 2021-08-24 | End: 2021-08-24 | Stop reason: HOSPADM

## 2021-08-24 RX ORDER — FENTANYL CITRATE 50 UG/ML
INJECTION, SOLUTION INTRAMUSCULAR; INTRAVENOUS
Status: DISPENSED
Start: 2021-08-24 | End: 2021-08-24

## 2021-08-24 RX ORDER — BUPIVACAINE HYDROCHLORIDE AND EPINEPHRINE 2.5; 5 MG/ML; UG/ML
INJECTION, SOLUTION EPIDURAL; INFILTRATION; INTRACAUDAL; PERINEURAL AS NEEDED
Status: DISCONTINUED | OUTPATIENT
Start: 2021-08-24 | End: 2021-08-24 | Stop reason: HOSPADM

## 2021-08-24 RX ORDER — INSULIN LISPRO 100 [IU]/ML
INJECTION, SOLUTION INTRAVENOUS; SUBCUTANEOUS EVERY 6 HOURS
Status: DISCONTINUED | OUTPATIENT
Start: 2021-08-25 | End: 2021-08-25 | Stop reason: HOSPADM

## 2021-08-24 RX ORDER — SODIUM CHLORIDE, SODIUM LACTATE, POTASSIUM CHLORIDE, CALCIUM CHLORIDE 600; 310; 30; 20 MG/100ML; MG/100ML; MG/100ML; MG/100ML
125 INJECTION, SOLUTION INTRAVENOUS CONTINUOUS
Status: DISCONTINUED | OUTPATIENT
Start: 2021-08-24 | End: 2021-08-24

## 2021-08-24 RX ORDER — NALOXONE HYDROCHLORIDE 0.4 MG/ML
0.1 INJECTION, SOLUTION INTRAMUSCULAR; INTRAVENOUS; SUBCUTANEOUS AS NEEDED
Status: DISCONTINUED | OUTPATIENT
Start: 2021-08-24 | End: 2021-08-24 | Stop reason: HOSPADM

## 2021-08-24 RX ORDER — MORPHINE SULFATE 10 MG/ML
6 INJECTION, SOLUTION INTRAMUSCULAR; INTRAVENOUS
Status: DISCONTINUED | OUTPATIENT
Start: 2021-08-24 | End: 2021-08-25

## 2021-08-24 RX ORDER — INSULIN LISPRO 100 [IU]/ML
INJECTION, SOLUTION INTRAVENOUS; SUBCUTANEOUS ONCE
Status: COMPLETED | OUTPATIENT
Start: 2021-08-24 | End: 2021-08-24

## 2021-08-24 RX ORDER — PROPOFOL 10 MG/ML
INJECTION, EMULSION INTRAVENOUS AS NEEDED
Status: DISCONTINUED | OUTPATIENT
Start: 2021-08-24 | End: 2021-08-24 | Stop reason: HOSPADM

## 2021-08-24 RX ORDER — MIDAZOLAM HYDROCHLORIDE 1 MG/ML
INJECTION, SOLUTION INTRAMUSCULAR; INTRAVENOUS AS NEEDED
Status: DISCONTINUED | OUTPATIENT
Start: 2021-08-24 | End: 2021-08-24 | Stop reason: HOSPADM

## 2021-08-24 RX ORDER — GLYCOPYRROLATE 0.2 MG/ML
INJECTION INTRAMUSCULAR; INTRAVENOUS AS NEEDED
Status: DISCONTINUED | OUTPATIENT
Start: 2021-08-24 | End: 2021-08-24 | Stop reason: HOSPADM

## 2021-08-24 RX ADMIN — Medication 20 MG: at 08:46

## 2021-08-24 RX ADMIN — PHENYLEPHRINE HYDROCHLORIDE 100 MCG: 10 INJECTION INTRAVENOUS at 07:53

## 2021-08-24 RX ADMIN — LABETALOL HYDROCHLORIDE 5 MG: 5 INJECTION INTRAVENOUS at 10:27

## 2021-08-24 RX ADMIN — SODIUM CHLORIDE, SODIUM LACTATE, POTASSIUM CHLORIDE, AND CALCIUM CHLORIDE 500 ML: 600; 310; 30; 20 INJECTION, SOLUTION INTRAVENOUS at 10:06

## 2021-08-24 RX ADMIN — METOCLOPRAMIDE HYDROCHLORIDE 10 MG: 5 INJECTION INTRAMUSCULAR; INTRAVENOUS at 14:44

## 2021-08-24 RX ADMIN — MORPHINE SULFATE 6 MG: 10 INJECTION INTRAVENOUS at 13:07

## 2021-08-24 RX ADMIN — Medication 20 MG: at 08:22

## 2021-08-24 RX ADMIN — ENOXAPARIN SODIUM 40 MG: 40 INJECTION SUBCUTANEOUS at 19:19

## 2021-08-24 RX ADMIN — KETOROLAC TROMETHAMINE 15 MG: 30 INJECTION, SOLUTION INTRAMUSCULAR; INTRAVENOUS at 23:58

## 2021-08-24 RX ADMIN — SODIUM CHLORIDE, SODIUM LACTATE, POTASSIUM CHLORIDE, AND CALCIUM CHLORIDE 150 ML/HR: 600; 310; 30; 20 INJECTION, SOLUTION INTRAVENOUS at 19:25

## 2021-08-24 RX ADMIN — ONDANSETRON HYDROCHLORIDE 4 MG: 2 INJECTION INTRAMUSCULAR; INTRAVENOUS at 09:24

## 2021-08-24 RX ADMIN — SODIUM CHLORIDE, SODIUM LACTATE, POTASSIUM CHLORIDE, AND CALCIUM CHLORIDE 125 ML/HR: 600; 310; 30; 20 INJECTION, SOLUTION INTRAVENOUS at 07:00

## 2021-08-24 RX ADMIN — Medication 10 MG: at 08:55

## 2021-08-24 RX ADMIN — INSULIN LISPRO 3 UNITS: 100 INJECTION, SOLUTION INTRAVENOUS; SUBCUTANEOUS at 11:39

## 2021-08-24 RX ADMIN — CEFAZOLIN 2 G: 10 INJECTION, POWDER, FOR SOLUTION INTRAVENOUS at 16:16

## 2021-08-24 RX ADMIN — LABETALOL HYDROCHLORIDE 5 MG: 5 INJECTION INTRAVENOUS at 10:43

## 2021-08-24 RX ADMIN — KETOROLAC TROMETHAMINE 15 MG: 30 INJECTION, SOLUTION INTRAMUSCULAR; INTRAVENOUS at 13:07

## 2021-08-24 RX ADMIN — Medication 3 MG: at 09:21

## 2021-08-24 RX ADMIN — GLYCOPYRROLATE 0.2 MG: 0.2 INJECTION INTRAMUSCULAR; INTRAVENOUS at 07:27

## 2021-08-24 RX ADMIN — SODIUM CHLORIDE, SODIUM LACTATE, POTASSIUM CHLORIDE, AND CALCIUM CHLORIDE: 600; 310; 30; 20 INJECTION, SOLUTION INTRAVENOUS at 08:19

## 2021-08-24 RX ADMIN — FENTANYL CITRATE 50 MCG: 50 INJECTION, SOLUTION INTRAMUSCULAR; INTRAVENOUS at 10:17

## 2021-08-24 RX ADMIN — METOCLOPRAMIDE HYDROCHLORIDE 10 MG: 5 INJECTION INTRAMUSCULAR; INTRAVENOUS at 18:27

## 2021-08-24 RX ADMIN — CEFAZOLIN 2 G: 1 INJECTION, POWDER, FOR SOLUTION INTRAVENOUS at 07:48

## 2021-08-24 RX ADMIN — SODIUM CHLORIDE, SODIUM LACTATE, POTASSIUM CHLORIDE, AND CALCIUM CHLORIDE: 600; 310; 30; 20 INJECTION, SOLUTION INTRAVENOUS at 08:48

## 2021-08-24 RX ADMIN — ROCURONIUM BROMIDE 60 MG: 10 INJECTION, SOLUTION INTRAVENOUS at 07:37

## 2021-08-24 RX ADMIN — NYSTATIN 500000 UNITS: 100000 SUSPENSION ORAL at 06:44

## 2021-08-24 RX ADMIN — LIDOCAINE HYDROCHLORIDE 140 MG: 20 INJECTION, SOLUTION INTRAVENOUS at 07:37

## 2021-08-24 RX ADMIN — FENTANYL CITRATE 50 MCG: 50 INJECTION, SOLUTION INTRAMUSCULAR; INTRAVENOUS at 09:52

## 2021-08-24 RX ADMIN — FAMOTIDINE 20 MG: 10 INJECTION, SOLUTION INTRAVENOUS at 07:01

## 2021-08-24 RX ADMIN — Medication 50 MG: at 07:37

## 2021-08-24 RX ADMIN — FENTANYL CITRATE 25 MCG: 50 INJECTION, SOLUTION INTRAMUSCULAR; INTRAVENOUS at 11:11

## 2021-08-24 RX ADMIN — ENOXAPARIN SODIUM 40 MG: 40 INJECTION SUBCUTANEOUS at 06:46

## 2021-08-24 RX ADMIN — MIDAZOLAM 2 MG: 1 INJECTION INTRAMUSCULAR; INTRAVENOUS at 07:27

## 2021-08-24 RX ADMIN — SUGAMMADEX 200 MG: 100 INJECTION, SOLUTION INTRAVENOUS at 09:31

## 2021-08-24 RX ADMIN — LEVALBUTEROL HYDROCHLORIDE 0.63 MG: 0.63 SOLUTION RESPIRATORY (INHALATION) at 07:12

## 2021-08-24 RX ADMIN — ROCURONIUM BROMIDE 10 MG: 10 INJECTION, SOLUTION INTRAVENOUS at 08:46

## 2021-08-24 RX ADMIN — CEFAZOLIN 2 G: 10 INJECTION, POWDER, FOR SOLUTION INTRAVENOUS at 23:58

## 2021-08-24 RX ADMIN — PROPOFOL 140 MG: 10 INJECTION, EMULSION INTRAVENOUS at 07:37

## 2021-08-24 RX ADMIN — METOCLOPRAMIDE HYDROCHLORIDE 10 MG: 5 INJECTION INTRAMUSCULAR; INTRAVENOUS at 23:58

## 2021-08-24 RX ADMIN — SODIUM CHLORIDE, SODIUM LACTATE, POTASSIUM CHLORIDE, AND CALCIUM CHLORIDE 1000 ML: 600; 310; 30; 20 INJECTION, SOLUTION INTRAVENOUS at 07:01

## 2021-08-24 RX ADMIN — GLYCOPYRROLATE 0.3 MG: 0.2 INJECTION INTRAMUSCULAR; INTRAVENOUS at 09:21

## 2021-08-24 RX ADMIN — ACETAMINOPHEN 1000 MG: 500 TABLET ORAL at 06:44

## 2021-08-24 RX ADMIN — KETOROLAC TROMETHAMINE 15 MG: 30 INJECTION, SOLUTION INTRAMUSCULAR; INTRAVENOUS at 18:26

## 2021-08-24 RX ADMIN — FENTANYL CITRATE 25 MCG: 50 INJECTION, SOLUTION INTRAMUSCULAR; INTRAVENOUS at 10:51

## 2021-08-24 NOTE — NURSE NAVIGATOR
Patient's sister at bedside. Patient dozing in and out of sleep throughout visit. Patient complained of expected pain with mild nausea. Vitals:   Visit Vitals  BP (!) 156/67   Pulse 70   Temp 97.1 °F (36.2 °C)   Resp 18   Ht 5' 1\" (1.549 m)   Wt 115.3 kg (254 lb 4.8 oz)   SpO2 99%   BMI 48.05 kg/m²     General: Alert & oriented to person, place, time, and situation  Pulmonary: Lungs clear to auscultation in all fields. Cardiac: Regular rate and rhythm  Abdomen: Appropriate tenderness; soft; non-distended; hypo-active bowel sounds  Lap sites: Within normal limits  ABDIRIZAK drain: Small amount of serosanguinous drainage  Marrero catheter: 1,300 cc clear, yellow urine  SCD's: In place and operating WNL    Plan:  -Continue medications for symptom management  -Encourage ambulation  -SCD use when in bed  -IS 10 times an hour  -NPO  -UGI in AM; bariatric full liquid diet  if UGI within normal limits  -Marrero removed in AM    Plan was discussed with patient, as well as IS teaching provided. Patient verbalized understanding to plan and education. Patient completed IS x3 during this visit with no issues or concerns noted by this RN. She reached 1,000 mL.

## 2021-08-24 NOTE — BRIEF OP NOTE
Brief Postoperative Note    Patient: Zoltan Nash  YOB: 1965  MRN: [id number]    Date of Procedure: 8/24/2021     Pre-Op Diagnosis: GERD, MORBID OBESITY, BMI 48, POST BARIATRIC SURGERY, FATTY LIVER    Post-Op Diagnosis: Same as preoperative diagnosis. Procedure(s):  LAPAROSCOPIC CONVERSION OF SLEEVE GASTRECTOMY TO GASTRIC BYPASS, INTRAOPERATIVE ENDOSCOPY WITH BIOPSY, AND WEDGE LIVER BIOPSY     Surgeon(s):  Maura King MD    Surgical Assistant: Physician Assistant: GENA Beck  Surg Asst-1: Carlos Juarez    Anesthesia: General     Estimated Blood Loss (mL): Minimal    Complications: None    Specimens:   ID Type Source Tests Collected by Time Destination   1 : GASTRIC CARDIA BIOPSY Preservative Stomach  Maura King MD 8/24/2021 1563 Pathology   2 : [facility] LIVER BIOPSY Preservative Liver  Maura King MD 8/24/2021 0809 Pathology        Implants:   Implant Name Type Inv.  Item Serial No.  Lot No. LRB No. Used Action   ECHELON ENDOPATH STAPLE LINE REINFORCEMENT    [employer] [id number] N/A 4 Implanted       Drains:   Abdon-Roche Drain 08/24/21 Left Abdomen (Active)       Findings: none      Electronically Signed by Bryson Cary MD on 8/24/2021 at 9:24 AM

## 2021-08-24 NOTE — PROGRESS NOTES
1155  Received client from PACU in satisfactory condition. Client is a pt of Dr. Jessi Blake. Pt had a Lap conversion of Sleeve gastrectomy to Gastirc Bypass IntraOp Endoscopy with Bx and Wedge Liver bx  today. Client is A/O X 4. Client is calm and cooperative. Clients  denies numbness or tingling to any extremity. . With + Radial,Posterior Tibial and Dorsalis Pedis pulses. Capillary Refill less than 3 seconds. Skin is warm , dry and skin color is appropriate to race. Client is negative for JVD. Bibasilar breath sounds clear. No use of accessory muscles. Bowel sounds hypoactive to all quadrants. Abdomen is soft and non-tender. Client has calabrese cath draining clear yellow urine. Client has 5 trocar sites to abdomen with gauze and medipore dressings. Dressings are dry and intact. Pt has ABDIRIZAK to left abdomen draining reddish brown drainage. ABDIRIZAK drain has gauze and medipore dressing. Scant shadowing was noted on ABDIRIZAK dressing. PT is NPO except ice chips. [place] .Pt hasTed hose to both lower extremities. Sequential compression device applied. Client has LH 20  gauge PIV present and running LR at 125 ml/hr. Clients pain is 7/10 on 0-10 scale. Client oriented to call bell use as well as bed use. Client oriented to phone and how to order meals. Call bell within reach. Bed in low position. Three side rails up. Armbands/ fall risk band intact. Dual skin check done with Mk Navarrete RN. No skin breakdown noted. 26  Pt wants to ambulated. Pt attempted to get up and walk assisted by CNA but still dizzy and nauseated. Vomited scant amt of fluids. Pt back in bed.  1311   Pt medicated with morphine 6 mg Iv and Toradol 15 mg IV for pain level 7/10. .  1616   Pt's pain level 2/10. Pt out of bed and ambulated in hallway. .  1800   Pain level 2-3/10 and comfortable.

## 2021-08-24 NOTE — ANESTHESIA POSTPROCEDURE EVALUATION
Procedure(s):  LAPAROSCOPIC CONVERSION OF SLEEVE GASTRECTOMY TO GASTRIC BYPASS, INTRAOPERATIVE ENDOSCOPY WITH BIOPSY, AND WEDGE LIVER BIOPSY \"SPEC POP\". No value filed. Anesthesia Post Evaluation        Comments: Post-Anesthesia Evaluation and Assessment    Cardiovascular Function/Vital Signs  BP (!) 162/69   Pulse 70   Temp 36.8 °C (98.3 °F)   Resp 18   Ht 5' 1\" (1.549 m)   Wt 115.3 kg (254 lb 4.8 oz)   SpO2 98%   BMI 48.05 kg/m²     Patient is status post Procedure(s):  LAPAROSCOPIC CONVERSION OF SLEEVE GASTRECTOMY TO GASTRIC BYPASS, INTRAOPERATIVE ENDOSCOPY WITH BIOPSY, AND WEDGE LIVER BIOPSY \"SPEC POP\". Nausea/Vomiting: Controlled. Postoperative hydration reviewed and adequate. Pain:  Pain Scale 1: FLACC (08/24/21 1101)  Pain Intensity 1: 0 (08/24/21 1101)   Managed. Neurological Status:   Neuro (WDL): Exceptions to WDL (08/24/21 1017)   At baseline. Mental Status and Level of Consciousness: Arousable. Pulmonary Status:   O2 Device: Nasal cannula (08/24/21 1017)   Adequate oxygenation and airway patent. Complications related to anesthesia: None    Post-anesthesia assessment completed. No concerns. Patient has met all discharge requirements. Signed By: Scar Manriquez MD    August 24, 2021                     INITIAL Post-op Vital signs:   Vitals Value Taken Time   /69 08/24/21 1105   Temp 36.8 °C (98.3 °F) 08/24/21 0942   Pulse 70 08/24/21 1107   Resp 19 08/24/21 1107   SpO2 99 % 08/24/21 1107   Vitals shown include unvalidated device data.

## 2021-08-24 NOTE — INTERVAL H&P NOTE
Update History & Physical    The Patient's History and Physical of August 9, 2021 was reviewed with the patient and I examined the patient. There was no change. The surgical site was confirmed by the patient and me. Plan:  The risk, benefits, expected outcome, and alternative to the recommended procedure have been discussed with the patient. Patient understands and wants to proceed with the procedure.     Electronically signed by Radha Velasquez MD on 8/24/2021 at 7:18 AM

## 2021-08-24 NOTE — PERIOP NOTES
TRANSFER - OUT REPORT:    Verbal report given to Jose D Kimball RN(name) on Yvette Morgan  being transferred to [facility](unit) for routine post - op       Report consisted of patients Situation, Background, Assessment and   Recommendations(SBAR). Information from the following report(s) SBAR, Kardex, Intake/Output, MAR and Cardiac Rhythm NSR was reviewed with the receiving nurse. Lines:   Peripheral IV 08/24/21 Left Hand (Active)   Site Assessment Clean, dry, & intact 08/24/21 1017   Phlebitis Assessment 0 08/24/21 1017   Infiltration Assessment 0 08/24/21 1017   Dressing Status Clean, dry, & intact 08/24/21 1017   Dressing Type Transparent;Tape 08/24/21 1017   Hub Color/Line Status Pink;Patent; Infusing 08/24/21 1017        Opportunity for questions and clarification was provided.       Patient transported with:   O2 @ 3 liters  Registered Nurse  Kristine    Family has belongings

## 2021-08-24 NOTE — ROUTINE PROCESS
1150  TRANSFER - IN REPORT:    Verbal report received from JUAQUIN Varela RN(name) on Itzel Hansen  being received from [facility]) for routine post - op      Report consisted of patients Situation, Background, Assessment and   Recommendations(SBAR). Information from the following report(s) SBAR, Kardex and MAR was reviewed with the receiving nurse. Opportunity for questions and clarification was provided. Assessment completed upon patients arrival to unit and care assumed.

## 2021-08-24 NOTE — NURSE NAVIGATOR
Due to patient being diabetic, Reglan was ordered and Phenergan was discontinued. Jessie Anne, made aware.

## 2021-08-24 NOTE — PERIOP NOTES
Reviewed PTA medication list with patient/caregiver and patient/caregiver denies any additional medications. Patient admits to having a responsible adult care for them at home for at least 24 hours after surgery. Patient encouraged to use gown warming system and informed that using said warming gown to regulate body temperature prior to a procedure has been shown to help reduce the risks of blood clots and infection. Patient's pharmacy of choice verified and documented in PTA medication section. Dual skin assessment & fall risk band verification completed with kanu WATSON.

## 2021-08-24 NOTE — PERIOP NOTES
Paged Dr. John Silver for patient sign out. Patient meets criteria for transfer to the next phase of care. Dr. Dionne Yost made aware of pt BG.  Orders given

## 2021-08-24 NOTE — ANESTHESIA PREPROCEDURE EVALUATION
Relevant Problems   No relevant active problems       Anesthetic History     PONV          Review of Systems / Medical History  Patient summary reviewed, nursing notes reviewed and pertinent labs reviewed    Pulmonary            Asthma ( last ER was 2016) : well controlled  Pertinent negatives: No COPD and smoker     Neuro/Psych   Within defined limits           Cardiovascular    Hypertension      CHF (lowest EF 10 - 15%)             GI/Hepatic/Renal     GERD: well controlled    Renal disease: CRI    Pertinent negatives: No liver disease   Endo/Other    Diabetes: well controlled    Morbid obesity     Other Findings   Comments: etoh neg           Physical Exam    Airway  Mallampati: I  TM Distance: > 6 cm         Cardiovascular               Dental  No notable dental hx       Pulmonary                 Abdominal         Other Findings            Anesthetic Plan    ASA: 3  Anesthesia type: general            Anesthetic plan and risks discussed with: Patient

## 2021-08-25 ENCOUNTER — APPOINTMENT (OUTPATIENT)
Dept: GENERAL RADIOLOGY | Age: 56
DRG: 621 | End: 2021-08-25
Attending: SPECIALIST
Payer: MEDICARE

## 2021-08-25 VITALS
SYSTOLIC BLOOD PRESSURE: 149 MMHG | BODY MASS INDEX: 48.01 KG/M2 | TEMPERATURE: 99 F | WEIGHT: 254.3 LBS | OXYGEN SATURATION: 97 % | RESPIRATION RATE: 22 BRPM | HEIGHT: 61 IN | DIASTOLIC BLOOD PRESSURE: 96 MMHG | HEART RATE: 116 BPM

## 2021-08-25 LAB
BASOPHILS # BLD: 0 K/UL (ref 0–0.1)
BASOPHILS NFR BLD: 0 % (ref 0–2)
DIFFERENTIAL METHOD BLD: ABNORMAL
EOSINOPHIL # BLD: 0.2 K/UL (ref 0–0.4)
EOSINOPHIL NFR BLD: 2 % (ref 0–5)
ERYTHROCYTE [DISTWIDTH] IN BLOOD BY AUTOMATED COUNT: 12.4 % (ref 11.6–14.5)
GLUCOSE BLD STRIP.AUTO-MCNC: 110 MG/DL (ref 70–110)
GLUCOSE BLD STRIP.AUTO-MCNC: 112 MG/DL (ref 70–110)
GLUCOSE BLD STRIP.AUTO-MCNC: 122 MG/DL (ref 70–110)
HCT VFR BLD AUTO: 36.1 % (ref 35–45)
HGB BLD-MCNC: 11.9 G/DL (ref 12–16)
LYMPHOCYTES # BLD: 2.2 K/UL (ref 0.9–3.6)
LYMPHOCYTES NFR BLD: 22 % (ref 21–52)
MCH RBC QN AUTO: 27.3 PG (ref 24–34)
MCHC RBC AUTO-ENTMCNC: 33 G/DL (ref 31–37)
MCV RBC AUTO: 82.8 FL (ref 78–100)
MONOCYTES # BLD: 1 K/UL (ref 0.05–1.2)
MONOCYTES NFR BLD: 10 % (ref 3–10)
NEUTS SEG # BLD: 6.7 K/UL (ref 1.8–8)
NEUTS SEG NFR BLD: 66 % (ref 40–73)
PLATELET # BLD AUTO: 236 K/UL (ref 135–420)
PMV BLD AUTO: 10.5 FL (ref 9.2–11.8)
RBC # BLD AUTO: 4.36 M/UL (ref 4.2–5.3)
WBC # BLD AUTO: 10.1 K/UL (ref 4.6–13.2)

## 2021-08-25 PROCEDURE — 77010033678 HC OXYGEN DAILY

## 2021-08-25 PROCEDURE — 85025 COMPLETE CBC W/AUTO DIFF WBC: CPT

## 2021-08-25 PROCEDURE — 82962 GLUCOSE BLOOD TEST: CPT

## 2021-08-25 PROCEDURE — 74240 X-RAY XM UPR GI TRC 1CNTRST: CPT

## 2021-08-25 PROCEDURE — 74011000250 HC RX REV CODE- 250: Performed by: SPECIALIST

## 2021-08-25 PROCEDURE — 36415 COLL VENOUS BLD VENIPUNCTURE: CPT

## 2021-08-25 PROCEDURE — 74011000636 HC RX REV CODE- 636: Performed by: SPECIALIST

## 2021-08-25 PROCEDURE — C9113 INJ PANTOPRAZOLE SODIUM, VIA: HCPCS | Performed by: SPECIALIST

## 2021-08-25 PROCEDURE — 74011250636 HC RX REV CODE- 250/636: Performed by: SPECIALIST

## 2021-08-25 RX ORDER — OXYCODONE AND ACETAMINOPHEN 5; 325 MG/1; MG/1
1-2 TABLET ORAL
Status: DISCONTINUED | OUTPATIENT
Start: 2021-08-25 | End: 2021-08-25 | Stop reason: HOSPADM

## 2021-08-25 RX ORDER — ARFORMOTEROL TARTRATE 15 UG/2ML
15 SOLUTION RESPIRATORY (INHALATION)
Status: DISCONTINUED | OUTPATIENT
Start: 2021-08-25 | End: 2021-08-25 | Stop reason: HOSPADM

## 2021-08-25 RX ORDER — ALBUTEROL SULFATE 0.83 MG/ML
2.5 SOLUTION RESPIRATORY (INHALATION)
Status: DISCONTINUED | OUTPATIENT
Start: 2021-08-25 | End: 2021-08-25 | Stop reason: HOSPADM

## 2021-08-25 RX ORDER — BUDESONIDE 0.25 MG/2ML
250 INHALANT ORAL
Status: DISCONTINUED | OUTPATIENT
Start: 2021-08-25 | End: 2021-08-25 | Stop reason: HOSPADM

## 2021-08-25 RX ADMIN — METOCLOPRAMIDE HYDROCHLORIDE 10 MG: 5 INJECTION INTRAMUSCULAR; INTRAVENOUS at 06:16

## 2021-08-25 RX ADMIN — IOHEXOL 75 ML: 240 INJECTION, SOLUTION INTRATHECAL; INTRAVASCULAR; INTRAVENOUS; ORAL at 10:46

## 2021-08-25 RX ADMIN — SODIUM CHLORIDE, SODIUM LACTATE, POTASSIUM CHLORIDE, AND CALCIUM CHLORIDE 150 ML/HR: 600; 310; 30; 20 INJECTION, SOLUTION INTRAVENOUS at 03:38

## 2021-08-25 RX ADMIN — MORPHINE SULFATE 6 MG: 10 INJECTION INTRAVENOUS at 04:54

## 2021-08-25 RX ADMIN — KETOROLAC TROMETHAMINE 15 MG: 30 INJECTION, SOLUTION INTRAMUSCULAR; INTRAVENOUS at 14:07

## 2021-08-25 RX ADMIN — METOCLOPRAMIDE HYDROCHLORIDE 10 MG: 5 INJECTION INTRAMUSCULAR; INTRAVENOUS at 14:07

## 2021-08-25 RX ADMIN — ENOXAPARIN SODIUM 40 MG: 40 INJECTION SUBCUTANEOUS at 06:16

## 2021-08-25 RX ADMIN — SODIUM CHLORIDE 40 MG: 9 INJECTION, SOLUTION INTRAMUSCULAR; INTRAVENOUS; SUBCUTANEOUS at 09:22

## 2021-08-25 RX ADMIN — KETOROLAC TROMETHAMINE 15 MG: 30 INJECTION, SOLUTION INTRAMUSCULAR; INTRAVENOUS at 06:16

## 2021-08-25 NOTE — PROGRESS NOTES
Bariatric Surgery                POD #1    Visit Vitals  /84   Pulse (!) 121   Temp 98.7 °F (37.1 °C)   Resp 18   Ht 5' 1\" (1.549 m)   Wt 115.3 kg (254 lb 4.8 oz)   SpO2 92%   BMI 48.05 kg/m²     Patient has minimal complaints of pain, minimal nausea noted     Exam:  Appears well in no distress  Lungs- clear bilaterally  Abd - soft, incisions look good without erythema           ABDIRIZAK with minimal serosanguinous output  Extremities- no new edema or swelling    UGI - no obstrustion or leak    Data Review:    Labs: Results:       Chemistry No results for input(s): GLU, NA, K, CL, CO2, BUN, CREA, CA, AGAP, BUCR, TBIL, AP, TP, ALB, GLOB, AGRAT in the last 72 hours. No lab exists for component: GPT   CBC w/Diff Recent Labs     08/25/21  1120   WBC 10.1   RBC 4.36   HGB 11.9*   HCT 36.1      GRANS 66   LYMPH 22   EOS 2      Coagulation No results for input(s): PTP, INR, APTT, INREXT in the last 72 hours. Liver Enzymes No results for input(s): TP, ALB, TBIL, AP in the last 72 hours. No lab exists for component: SGOT, GPT, DBIL       Assessment/Plan: S/P  conversion sleeve to bypass - heart rate coming down with IS use and better pulmonary effort - pulse 90 at note time. Hgb 11.9 on repeat CBC. 1.Start bariatric diet and protein shakes  2. D/C IV pain meds and start PO pain meds  3. D/C ABDIRIZAK drain  4. Likley PM D/C if  Cont ok and tolerate PO

## 2021-08-25 NOTE — DISCHARGE INSTRUCTIONS
OPERATIVE REPORT         Patient:Elena Rubalcava   : 1965  Medical Record [id number]    Pre-operative Diagnosis:  GERD, MORBID OBESITY, BMI 48, POST BARIATRIC SURGERY, FATTY LIVER  Post-operative Diagnosis: GERD, MORBID OBESITY, BMI 48, POST BARIATRIC SURGERY, FATTY LIVER  Procedure: Procedure(s):  LAPAROSCOPIC CONVERSION OF SLEEVE GASTRECTOMY TO GASTRIC BYPASS, INTRAOPERATIVE ENDOSCOPY WITH BIOPSY, AND WEDGE LIVER BIOPSY \"SPEC POP\"  Location: [facility]  Surgeon: GENA Bhat  Assistant:  Sheryl Good [facility]    Anesthesia: General       Specimens: 1. Gastric Sleeve Resection                       2. Liver Biopsy    EBL: less than ***cc  Additional Findings: none             STATEMENT OF MEDICAL NECESSITY: The patient is a 60y.o.-year-old female who has had a history of obesity. she has failed conservative weight loss measures,   such began to consider weight loss surgical options. she chose the   sleeve gastrectomy as a means of surgical weight control. she has undergone   nutritional and psychological teaching at this time period and does wish to proceed   with sleeve gastrectomy. OPERATIVE PROCEDURE: The patient was brought to the operating room, placed   on the table in supine position at which time general  anesthesia was administered   without any difficulty. The abdomen was then prepped and draped in the   usual sterile fashion. Using a 15 blade, a 1 cm incision was made just to the   left of the umbilicus. The veress needle approach was used to gain access to   the peritoneal cavity which was then insufflated. The Visi-Port was then placed   at that site,then 4 additional trocars were placed in the usual U-shaped   configuration with a subxiphoid incision being made to accommodate the   [facility] retractor. On entering the abdomen, the patient was noted to have a   *** fatty liver. I elevated the liver and noted the patient had *** diaphragmatic hernia present.  I began the operation by choosing an area 5 cm proximal to the pylorus and within the gastroepiploic vessels I began to divide off these vessels individually. I   moved cephalad toward short gastric vessels, which were very difficult to   take down due to the proximity to the splenic hilum. I was able to do so,   clearing the entire left crural area. I then dissected out the diaphragmatic hernia with the plan  to repair this at the termination of the resection. I then decompressed the stomach using the orogastric tube. I then replaced it with a 34-Sami bougie, impacting at   the distal antrum. I then began the resection with the powered Boronda   stapler using the *** loads for the first firing tangential along the   antral region. The second and subsequent firings were used with ***  reloads  reaching just past the incisura region. The remainder of the ***vertical   firings completed the resection at the left crural region. I removed the   bougie at this time and then replaced it with the orogastric tube and tested   the pouch using dilute methylene blue,iIt was noted to be completely   watertight. I then obtained hemostasis along the staple line using   Hemoclips and sutures where needed. I then turned my attention to the   diaphragmatic repair. I closed it using 2 separate 2-0 Ethibond sutures   against the esophageal wall, taking care not to encroach upon the esophagus   itself. I then used Eviseal along the entire staple line to obtain hemostasis and then place Surgicel Snow over the staple line also. With all this having been completed, I then removed the liver retractor. I did biopsy left lobe of the liver due to its abnormality and   submitted to pathology for permanent section. I then placed a ABDIRIZAK drain in   the left upper quadrant region along the staple line. I removed the specimen from the operative field via the LLQ incision.  I closed the left lower quadrant trocar site using a transabdominal #0 PDS suture along the fascia, and all skin incisions were then closed using 4-0 subcuticular Monocryl. Steri-Strips and sterile dressings were applied. The patient tolerated the procedure well.        Ruben Chang M.D.

## 2021-08-25 NOTE — ROUTINE PROCESS
Bedside and Verbal shift change report given to Raffi Jason RN by Valdez Kimble RN. Report included the following information SBAR, Kardex, Intake/Output and MAR.

## 2021-08-25 NOTE — PROGRESS NOTES
Problem: Falls - Risk of  Goal: *Absence of Falls  Description: Document Victor Hugo Cordero Fall Risk and appropriate interventions in the flowsheet.   Outcome: Progressing Towards Goal  Note: Fall Risk Interventions:  Mobility Interventions: Patient to call before getting OOB    Mentation Interventions: Door open when patient unattended    Medication Interventions: Patient to call before getting OOB    Elimination Interventions: Call light in reach

## 2021-08-25 NOTE — PROGRESS NOTES
3844  Bedside shift change report given to Aurelia Daily RN (oncoming nurse) by Guerrero Peter (offgoing nurse). Report included the following information SBAR, Kardex, Intake/Output and MAR.     0830  Assumed care at this time. Patient alert and oriented x4. Denies SOB, chest pain. Shows no signs of distress. Patient lungs clear diminished bilaterally. Cap refill < 3 sec to all extremities. Patient has 20 G IV to L hand CDI. Stated pain 0/10. Dressing to abdomen is CDI. ABDIRIZAK drain with serosanguineous output. SCDs and rolando stockings applied to BLE. Incentive spirometer at bedside. Patient reaches 1500 on IS. Bowel sounds present. Skin intact. Patient call light and possessions within reach. Bed locked and in lowest position. Nurse will continue to monitor. 0085  Patient O2 85% on room air at 0812. Primary nurse went to reassess. Patient placed on 4 L O2 and IS encouraged. Patient gradually weaned off O2 within a couple minutes with deep breathing exercises. Room air 98%. Pulse 122. Patient reassessed at 26. O2 92% on room air. Encouraged IS. Patient then maintained 98% on room air, observed for 5 minutes. Will continue to monitor. Spoke to Dr. Jessi Blake via telephone and he was notified. Verbal order with readback: stat CBC. Order home inhaler medications and adjust fluids to 100 ml/hr. [address] to pharmacy. They directed in placing orders for substitutes of home medications. Patient takes advair 500-50 mg and albuterol hfa 108 (90 base) inhaler at home. Per Dr. Elvi Lerner, place substitutes as needed. [address] removed. Gauze and tape placed. 1528  Patient 97% on room air. 1530  Dual AVS reviewed with Nai Nielsen. All medications reviewed individually with patient. Opportunities for questions and concerns provided. Patient to be discharged via (car, transport, ambulance). Patient's armband appropriately discarded.

## 2021-08-25 NOTE — NURSE NAVIGATOR
Patient sitting on side of bed sipping protein drink and tolerating well. Vitals:   Blood pressure 115/60, pulse (!) 122, temperature 99.4 °F (37.4 °C), resp. rate 17, height 5' 1\" (1.549 m), weight 115.3 kg (254 lb 4.8 oz), SpO2 97 %. Output: Patient is aware she will need to urinate prior to discharge. Pulmonary: Clear in all lobes  Cardiac: Regular rate and rhythm  Abdomen: Bowel sounds hypo-active x4. Lap sites without erythema, swelling,  and/or drainage. ABDIRIZAK drain with a small amount of serosanguinous drainage. SCD's: Positioned and operating WNL    Patient with expected pain, but is being managed and is currently tolerable. No nausea and/or vomiting. Patient has been ambulating the halls. Patient has not had the opportunity to swallow pills. Post-op diet progression discussed with patient. Patient to be discharged on a bariatric full liquid diet. Patient verbalized understanding of liquid diet for next two weeks until first post-op visit. Goal of four ounces per hour with one ounce being protein was clearly understood. Medications were discussed (i.e., pain- Percocet, Tylenol, not to use aspirin or ibuprofen based products, as well as steroids). Constipation was discussed and ways to alleviate it were discussed. Education completed on IS use and to ambulate every hour when at home. Patient completed a return demonstration on IS with no issues or concerns from this RN. Lovenox filled and in the home. Lovenox education provided, and patient will be administering herself. Percocet script will be given to patient upon discharge. Patient has chewable multi-vitamin, probiotic, and Prilosec in the home; they were reviewed. Ketosis was also reviewed. Patient given a report card to record intake and a handout to support bedside education. All questions were answered by this RN, and patient verbalized understanding to all education provided.   Goals for discharge were discussed, and patient verbalized understanding. Post-op follow-up appt. cary keane.

## 2021-08-25 NOTE — PROGRESS NOTES
1930 - Assumed care at this time. Pt resting quietly in bed. No signs of distress. Will continue to monitor. Pt encouraged to call for assistance. 2134 - Patient A&Ox4, RA. Denies chest pain and SOB. Pt getting up to 1000 on IS. Denies nausea/ Denies vomiting. Pt is not flatus, pt is belching. SCD compression device and TEDs bilaterally. x5 Medipore dressings to abdomen C/D/I with shadowing to x1 dressing. ABDIRIZAK and Marrero draining and patent. Denies numbness/tingling/calf pain. Pain 2/10 with a tolerable level of 4/10. Pt educated on IS use, q2h rounds, importance of ambulation, pain management, CHG wipes, and 5am being the last dose of narcotics prior to GI study. Pt verbalized understanding, no concerns voiced. Call bell within reach, bed in lowest position. Pt encouraged to call for assistance. 2307 - Spoke with Dr. Rachel Lowe regarding pt's HR of 105 pain level of 3/10. Updated of current VS and drain output. Telephone orders to continue to monitor. 0435 - Pt ambulated in hallway. Patient rated pain 3/10 increasing with movement, pain medication administered per MAR. No other concerns voiced at this time. Call bell within reach, bed in lowest position. Pt encouraged to use call bell for any needs.

## 2021-08-25 NOTE — ROUTINE PROCESS
1930  Bedside and Verbal shift change report given to Jasiel Barnes RN (oncoming nurse) by Otis Mejia RN (offgoing nurse). Report included the following information SBAR, Kardex and MAR.

## 2021-08-25 NOTE — PROGRESS NOTES
Transition of Care (CRISTOFER) Plan:          Pt admitted for an elective surgical procedure - patient had conversion of paroaoscopic gastric bypass procedure with a 150 cm Pedro limb bypass, extensive laparoscpic lysis of adhesions, wedge liver biopsy and endoscopy with biopsy on 8/24/21. Carmenrufina Hayesnick Pt is independent. Please encourage ambulation. No transition of care needs identified at this time. Anticipate pt will be medically stable for discharge within the next 24-48 hours with physician follow up. CM available to assist as needed. CRISTOFER Transportation:   How is patient being transported at discharge? Family/Friend      When? Once cleared by physician     Is transport scheduled? N/A      Follow-up appointment and transportation:   PCP/Specialist?  See AVS for Appointment         Who is transporting to the follow-up appointment? Self/Family/Friend      Is transport for follow up appointment scheduled? N/A    Communication plan (with patient/family): Who is being called? Patient or Next of Kin? Responsible party? Patient      What number(s) is to be used? See Facesheet      What service provider is calling for [facility] services? When are they calling? Readmission Risk?   (Green/Low; Yellow/Moderate; Red/High):  [employer] here to complete [address] including selection of the Healthcare Decision Maker Relationship (ie \"Primary\")

## 2021-08-25 NOTE — OP NOTES
[facility]  OPER[facility] REPORT    Name:  Tian Reed  MR#:   [id number]  :  1965  ACCOUNT #:  [de-identified]  DATE OF SERVICE:  2021    PREOPERATIVE DIAGNOSES:  1. Severe gastroesophageal reflux disease status post laparoscopic sleeve gastrectomy at another institution. 2.  Persistent morbid obesity. POSTOPERATIVE DIAGNOSES:  1. Severe gastroesophageal reflux disease status post laparoscopic sleeve gastrectomy at another institution. 2.  Persistent morbid obesity. 3.  Severe fatty infiltration of liver. PROCEDURE PERFORMED:  1. Conversion to laparoscopic sleeve gastrectomy to laparoscopic gastric bypass procedure with a 150-cm Pedro limb bypass in an antecolic-antegastric fashion. 2.  Extensive laparoscopic lysis of adhesions in excess of 30 minutes' duration. 3.  Wedge liver biopsy. 4.  Endoscopy with biopsy. SURGEON:  Anabella Mckeon MD    ASSISTANT:  OZZIE Floyd MS. assisted with procedure since there were no qualified surgeons, interns, or residents available. He assisted with exposure during procedure, adhesiolysis, creation of new gastric pouch, creation of new Pedro limb and anastomosis, closure of skin and fascial incisions. ANESTHESIA:  General endotracheal.    COMPLICATIONS:  None. SPECIMENS REMOVED:  1.  Wedge liver biopsy specimen. 2.  Endoscopy biopsy specimen. IMPLANTS:  None. ESTIMATED BLOOD LOSS:  Less than 5 mL. STATEMENT OF MEDICAL NECESSITY:  The patient is a 49-year-old female who had a laparoscopic sleeve gastrectomy performed at [facility] by Dr. Efra Perez. Almost  immediately after surgery, she began to experience severe reflux disease and regurgitation and despite maximal PPI therapy, she never achieved control of her reflux. In addition, never lost the desired amount of weight. She presented to us in consultation about 2 years out from her surgery. We did assess her sleeve.   It looked relatively normal with exception of the fact, she did have reflux symptoms occurring and since she had intractable reflux with her sleeve, we did discuss conversion to the gastric bypass procedure for the benefit of reflux control and additional weight loss. She did understand the additional risks as it pertains to secondary procedure and she did wish to proceed with operative intervention. PROCEDURE:  The patient was brought to operating room, placed on the table in supine position, at which time general anesthesia was administered without any difficulty. Her abdomen was prepped and draped in usual sterile fashion. Using 15-blade, a 1-cm incision was made just left of the umbilicus. Veress needle approach was used to gain access to peritoneal cavity which was then insufflated. The Finesse Melba was then placed at that site. Then, four additional trocars were placed in the usual U-shaped configuration. On entering the abdomen, the patient was noted to have adhesive disease consistent with a prior sleeve. I began to clear the abdominal cavity of this adhesive disease. I cleared the anterior abdominal wall. I then cleared the entire left subhepatic space. I dissected all the way to the level of diaphragm and dissected all the way along the lateral wall of the sleeve itself. On inspection of the sleeve, it did appear that this was more of older traditional sleeve and that the point of starting from her sleeve resection was at least 7 cm or more from the pylorus which is certainly an older technique. Despite that unusual portion of the operation and the large retained antrum, the remainder of the sleeve appeared to be relatively normal.  I began the operation at this juncture. I placed a calibration tube into the stomach. I inflated the balloon to 20 mL of saline, pulled it back towards the gastroesophageal junction.   I then dissected along the lesser curvature of the proximal sleeve and entered the retrogastric space fairly easily. I then knew at this time period,  I would be able to convert her to a gastric bypass, therefore, I removed the calibration tube. I turned my attention to the small bowel portion of operation where I located ligament of Treitz. I then measured an area approximately 40-50 cm distal ligament of Treitz. I transected small bowel using the Endo DOLORES stapler with white reloads. I then undercut the mesentery using 2.5 firings of the Endo DOLORES stapler which allowed for excellent mobilization to the upper abdomen. Once I had achieved this, I then measured off a 150-cm Pedro limb bypass. I placed it in a side-to-side fashion with afferent limb, placing stay sutures in two limbs of bowel. I then created enterotomies in the two limbs of bowel. I placed the stapling device intraluminally. I then closed and fired creating the linear anastomosis. With this anastomosis being hemostatic, free margin of the enterotomy was then reapproximated using 2-0 Ethibond suture and these sutures were held up to facilitate closure using stapling device. Once I had achieved this, I then closed the mesenteric defect at that site using running 2-0 Ethibond suture. Pedro limb was then brought over the transverse colon and dividing the omentum in the midline. I then turned my attention towards the creation of the new gastric pouch and due to the fact that there was so was so much retained antrum present, I just simply created a lateral gastrotomy at the junction of the initial sleeve resection and the retained antrum. I then guided the cap of a 21 ILS stapler transabdominally. I guided through the gastrotomy and out through the anterior gastric pouch in the area I had chosen for the anastomosis previously. Once I retrieved the cap, a pursestring suture was then placed at the base and tied securely. Once this was achieved, I then turned my attention towards creation of the new gastric pouch.   I used the [place] 60 stapler to perform this using green reloads and Endopath strips. I fired one single firing along the base of the planned pouch. This went without event. What I was left with was now a tubularized gastric pouch which was about 25-30 mL in size. It should be noted there was no hiatal hernia present on inspection. I then closed the lateral gastrotomy using same stapling device. Once I had achieved this, I turned my attention towards  bringing the Pedro limb in an antecolic-antegastric fashion. It reached nicely to the level of pouch and no tension at all. I then opened up the free end using the Harmonic scalpel. I guided the circular stapling device transabdominally guiding it through the enterotomy, then I deployed the sphere through the antimesenteric border of the bowel and then attached it to the cap, closed it and fired creating the circular anastomosis. With two very good tissue rings noted within the stapling device, free margin of Pedro limb was then trimmed free using the Endo DOLORES stapler with white reloads. I then oversewed the anastomosis using 2-0 Ethibond suture. I then went to head of the table where I performed endoscopy on the patient. The patient's oropharynx was normal.  Distal esophagus was normal.  There was mild esophagitis present, did enter the gastric pouch, it was totally viable, nonbleeding, and anastomosis was nonbleeding. I did biopsy of the lateral wall of pouch for H. pylori assessment at this juncture. I went back towards the abdominal portion of operation where all areas were checked for hemostasis. I then used Vistaseal at all staple lines and then placed some Surgicel along all staple lines. All areas were totally hemostatic. I then removed the liver retractor and due to significant enlargement of her liver and nodularity throughout, I did biopsy the left lobe of the liver in a wedge-shaped fashion and I submitted to Pathology for permanent section.   I then once again checked all areas for hemostasis and everything was intact. I closed the left lower quadrant trocar site using a transabdominal 0 PDS suture along the fascia and all skin incisions were then closed with 4-0 subcuticular Monocryl. Steri-Strips and sterile dressings were applied. The patient tolerated the procedure well.       Mami Mao MD      [id number]  D:  08/25/2021 8:00  T:  08/25/2021 11:05  JOB #:  [id number]

## 2021-08-26 NOTE — DISCHARGE SUMMARY
Discharge Summary    Patient: Guillermina Noguera               Sex: female          DOA: 8/24/2021         YOB: 1965      Age:  60 y.o.        LOS:  LOS: 1 day                Admit Date: 8/24/2021    Discharge Date: 8/26/2021    Admission Diagnoses:  Morbid obesity ([facility] [E66.01]    Discharge Diagnoses:    Problem List as of 8/25/2021 Date Reviewed: 5/10/2021        Codes Class Noted - Resolved    Morbid obesity with body mass index (BMI) of 40.0 to 49.9 (HCC) ICD-10-CM: E66.01  ICD-9-CM: 278.01  Unknown - Present        History of placement of internal cardiac defibrillator ICD-10-CM: Z95.810  ICD-9-CM: V12.59  Unknown - Present    Overview Signed 8/9/2021  1:47 PM by GENA Rodríguez     placed 2019             Hypertension ICD-10-CM: I10  ICD-9-CM: 401.9  Unknown - Present        High cholesterol ICD-10-CM: E78.00  ICD-9-CM: 272.0  Unknown - Present        Diabetes ([facility] ICD-10-CM: E11.9  ICD-9-CM: 250.00  Unknown - Present        Asthma ICD-10-CM: J45.909  ICD-9-CM: 493.90  Unknown - Present        Migraines ICD-10-CM: X60.357  ICD-9-CM: 346.90  Unknown - Present        GERD (gastroesophageal reflux disease) ICD-10-CM: K21.9  ICD-9-CM: 530.81  Unknown - Present        Vitamin D deficiency ICD-10-CM: E55.9  ICD-9-CM: 268.9  Unknown - Present        Moderate obesity ICD-10-CM: E66.8  ICD-9-CM: 278.00  Unknown - Present        Morbid obesity with BMI of 45.0-49.9, adult ([facility] ICD-10-CM: E66.01, Z68.42  ICD-9-CM: 278.01, V85.42  Unknown - Present        Intestinal malabsorption ICD-10-CM: K90.9  ICD-9-CM: 579.9  5/10/2021 - Present        S/P laparoscopic sleeve gastrectomy ICD-10-CM: N09.81  ICD-9-CM: V45.86  5/10/2021 - Present        RLS (restless legs syndrome) ICD-10-CM: G25.81  ICD-9-CM: 333.94  Unknown - Present        Low back pain ICD-10-CM: M54.5  ICD-9-CM: 724.2  8/26/2017 - Present        Chest pain ICD-10-CM: R07.9  ICD-9-CM: 786.50  8/26/2017 - Present        Headache ICD-10-CM: R51.9  ICD-9-CM: 784.0  8/26/2017 - Present        Heart failure (HCC) ICD-10-CM: I50.9  ICD-9-CM: 428.9  1/2016 - Present        Morbid obesity (HCC) ICD-10-CM: E66.01  ICD-9-CM: 278.01  Unknown - Present              Discharge Condition: Good    Hospital Course: The patient underwent  laparoscopic gastric bypass surgery  on 8/24/2021. The patient tolerated the procedure well. Vital signs remained stable and the patient was transferred to  3rd floor surgical unit without complications. The patient remained stable throughout the first night post operatively with stable vital signs and adequate urine output and pain control. Pain was controlled with Dilaudid IV and IV Tylenol . The patient on the first morning post operative was transferred to the radiology suite where they underwent a gastrograffin UGI study which showed no evidence of a leak or stricture. The drain was discontinued on POD # 1 and the patient was started on a bariatric liquid diet with protein shakes. The patient progressed throughout the day and was ambulating well and tolerating their diet. They were therefore discharged home with instructions to notify me with any issues that may arise. Significant Diagnostic Studies:   Recent Labs     08/25/21  1120   HGB 11.9*     Recent Labs     08/25/21  1120   HCT 36.1       Discharge Medication List as of 8/25/2021  3:06 PM      CONTINUE these medications which have NOT CHANGED    Details   oxyCODONE-acetaminophen (PERCOCET) 5-325 mg per tablet Take 1 Tablet by mouth every four (4) hours as needed for Pain for up to 30 days. Max Daily Amount: 6 Tablets., Normal, Disp-30 Tablet, R-0      enoxaparin (LOVENOX) 40 mg/0.4 mL 0.4 mL by SubCUTAneous route every twelve (12) hours every twelve (12) hours. , Normal, Disp-14 Syringe, R-0      scopolamine (TRANSDERM-SCOP) 1 mg over 3 days pt3d 1 Patch by TransDERmal route every seventy-two (72) hours. , Normal, Disp-1 Patch, R-0      montelukast (SINGULAIR) 10 mg tablet Take 10 mg by mouth nightly., Historical Med      sacubitriL-valsartan (Entresto)  mg tablet Take 1 Tablet by mouth two (2) times a day., Historical Med      cyanocobalamin, vitamin B-12, (VITAMIN B-12 PO) Take  by mouth., Historical Med      MAGNESIUM PO Take  by mouth., Historical Med      biotin 5 mg/mL liqd Take  by mouth., Historical Med      multivitamin (ONE A DAY) tablet Take 1 Tablet by mouth daily. , Historical Med      pantoprazole (Protonix) 40 mg tablet Take 40 mg by mouth two (2) times a day., Historical Med      ergocalciferol (ERGOCALCIFEROL) 50,000 unit capsule Take 1 Cap by mouth every seven (7) days. , Print, Disp-4 Cap, R-3      rizatriptan (MAXALT) 10 mg tablet Take 10 mg by mouth once as needed for Migraine. May repeat in 2 hours if needed, Historical Med      spironolactone (ALDACTONE) 25 mg tablet Take 50 mg by mouth two (2) times a day., Historical Med      carvedilol (COREG) 25 mg tablet Take 25 mg by mouth two (2) times daily (with meals). , Historical Med      atorvastatin (LIPITOR) 40 mg tablet Take 40 mg by mouth nightly., Historical Med      fluticasone-salmeterol (ADVAIR DISKUS) 500-50 mcg/dose diskus inhaler Take 1 Puff by inhalation as needed., Historical Med      albuterol (ACCUNEB) 1.25 mg/3 mL nebulizer solution Take 3 mL by inhalation every four (4) hours as needed for Wheezing (wheezing). , Print, Disp-25 Each, R-0      albuterol (PROVENTIL, VENTOLIN) 90 mcg/actuation inhaler Take 1-2 Puffs by inhalation every four (4) hours as needed for Wheezing., Print, Disp-17 g, R-1      ipratropium (ATROVENT) 0.02 % nebulizer solution 2.5 mL by Nebulization route as needed for Wheezing., Print, Disp-2.5 mL, R-0      fluticasone (FLONASE) 50 mcg/actuation nasal spray 1 [place] by Both Nostrils route nightly., Historical Med      Cetirizine (ZYRTEC) 10 mg cap Take 10 mg by mouth daily. , Historical Med         STOP taking these medications       acetaminophen (TylenoL) 325 mg tablet Comments:   Reason for Stopping:         ferrous sulfate (IRON PO) Comments:   Reason for Stopping:         CALCIUM PO Comments:   Reason for Stopping:         insulin glargine (Lantus Solostar U-100 Insulin) 100 unit/mL (3 mL) inpn Comments:   Reason for Stopping:         insulin aspart U-100 (NovoLOG Flexpen U-100 Insulin) 100 unit/mL (3 mL) inpn Comments:   Reason for Stopping:         FUROSEMIDE PO Comments:   Reason for Stopping:               Activity: activity as tolerated with no heavy lifting of greater than 20 pounds. No anti- inflammatory medications. Use stool softeners at home as needed while taking pain medications since they are constipating. Diet: Bariatric liquid diet    Wound Care: Keep wound clean and dry, Reinforce dressing PRN and ice to area for comfort. Do not get wound wet for 2 days.     Follow-up: 14 days with Dr Nessa Shaikh M.D

## 2021-08-27 ENCOUNTER — TELEPHONE (OUTPATIENT)
Dept: SURGERY | Age: 56
End: 2021-08-27

## 2021-08-27 NOTE — TELEPHONE ENCOUNTER
This RN spoke with patient post-operatively. Sipping: Patient is up to sipping 26 ounces yesterday. Today, she has sipped 12 ounces. RN re-educated her on the importance of increasing it to 64 ounces, and she verbalized understanding. Nausea and/or vomitting: None    Pain: Currently managed with Percocet     Lovenox injections: Administering every 12 hours, rotating sites. RN reminded patient to complete all injections, in which patient verbalized understanding. Lap sites: No erythema, drainage, and/or swelling    ABDIRIZAK drain site: No drainage; dressing removed    BM: None to date, but is passing flatus. Ambulation: Patient is walking throughout house every hour. IS: Patient continues to use 10x's per hour while awake. She has reached 1,800-2,000 mL. Temperature: 97 degrees    Pulse: 70 bpm    BP: 115/79    Medications: (confirmed currently taking)   *Multi-vitamin: yes   *Probiotic: yes   *Prilosec: yes    Questions: None    This RN reminded the patient to contact the office with any questions and/or concerns. RN also reminded patient they will receive another follow-up TC prior to the two week post-op follow-up appointment. Patient verbalized understanding to both. Patient's two week post-op visit is scheduled and was confirmed.

## 2021-08-31 ENCOUNTER — TELEPHONE (OUTPATIENT)
Dept: SURGERY | Age: 56
End: 2021-08-31

## 2021-08-31 NOTE — TELEPHONE ENCOUNTER
This RN spoke with patient post-operatively. Sipping: Patient is up to sipping 50-60 ounces daily. Nausea and/or vomitting: None    Pain: Currently managed with Tylenol as needed    Lovenox injections: Administering every 12 hours, rotating sites. RN reminded patient to complete all injections, in which patient verbalized understanding. Lap sites: No erythema, drainage, and/or swelling    ABDIRIZAK drain site: No drainage; dressing removed    BM: Twice daily    Ambulation: Patient is walking throughout house every hour. She will begin using her stationary bike. IS: Patient continues to use 10x's per hour while awake. Temperature: 95 degrees    Pulse: 73 bpm    BP: 110/82    Medications: (confirmed currently taking)   *Multi-vitamin: yes   *Probiotic: yes   *Prilosec: yes    Questions: None    This RN reminded the patient to contact the office with any questions and/or concerns. Patient verbalized understanding. Patient's two week post-op visit is scheduled and was confirmed.

## 2021-08-31 NOTE — TELEPHONE ENCOUNTER
RN attempted to contact patient post-operatively, but number kept ringing. RN will attempt to follow-up at a later date.

## 2021-09-07 ENCOUNTER — CLINICAL SUPPORT (OUTPATIENT)
Dept: SURGERY | Age: 56
End: 2021-09-07

## 2021-09-07 DIAGNOSIS — E66.01 MORBID OBESITY (HCC): Primary | ICD-10-CM

## 2021-09-07 NOTE — PROGRESS NOTES
Spoke with Joselyn Bermudez  today. Tolerating liquid diet very well. Protein shake intake: \"maybe 1.25-1.5\" Premier protein shake/day. Fluid intake: 48-54 oz/day (Powerade Zero,SF popsicles, broth, water and protein shakes). Foods being consumed include SF jello \"every now and then\", broth and SF popsicles; NO SF pudding, yogurt. No dietary complaints. Wt: 240 lbs    Activity: walking at [employer] for \"an hour or so\"/day x 5d/wk, \"tried the stationary bike, but the up and down on the left side makes the incision sore\"    Supplements:  [x] Flinstone's Complete Chewable MVI BID  [x] Probiotic QD  []Prilosec every day, pt states that she takes Protonix instead. Pt given one on one diet education over the phone. Reviewed diet progression for weeks 3-4. Patient appears to have a good understanding of the diet progression, food choices, and dietary/exercise habits for successful weight loss and nourishment after surgery. Reviewed pp. 32-45 of the patient education book. Discussed: post-op diet progression, including soft/pureed high protein, low fat, low sugar food recommendations; proper food group choices;  consumption of food and liquids, and consumption of adequate no-sugar, no-caffeine, no carbonation liquids. We reviewed appropriate food choices, meal adaptations (use of smaller dishes/utensils, eat slowly and chewing sufficiently for digestion), cooking techniques, and eating behavior modifications. Discussed intake regimen with 3 meals and 2-3 protein supplements per day. Additionally, intake timing - to include consuming a meal or snack every 3-4 hrs, the 30:30 rule, and having a meal within 2 hours of waking . Reinforced the importance of continued vitamin & probiotic consumption, adequate fluid with goal of 64 oz per day and adequate protein with goal of  grams per day.  Stressed the importance of 30 min of physical activity each day, as tolerated, with restricted lifting, to improve post op weight loss results and bowel function. Pt voiced understanding through repeating the information back to me. Pt had no questions or concerns at this time. Reminded pt that I would be calling them again at their 1 mo. aidan. Pt provided with RD contact information for nutritional questions or concerns between now and then.     RD: Jose Bragg, MS, RD

## 2021-09-08 ENCOUNTER — OFFICE VISIT (OUTPATIENT)
Dept: SURGERY | Age: 56
End: 2021-09-08
Payer: MEDICARE

## 2021-09-08 VITALS
TEMPERATURE: 97.7 F | WEIGHT: 241.9 LBS | BODY MASS INDEX: 45.67 KG/M2 | OXYGEN SATURATION: 96 % | SYSTOLIC BLOOD PRESSURE: 116 MMHG | HEART RATE: 72 BPM | DIASTOLIC BLOOD PRESSURE: 62 MMHG | HEIGHT: 61 IN

## 2021-09-08 DIAGNOSIS — Z09 POSTOPERATIVE EXAMINATION: Primary | ICD-10-CM

## 2021-09-08 PROCEDURE — 99024 POSTOP FOLLOW-UP VISIT: CPT | Performed by: NURSE PRACTITIONER

## 2021-09-08 RX ORDER — CHOLECALCIFEROL (VITAMIN D3) 50 MCG
CAPSULE ORAL
COMMUNITY

## 2021-09-08 NOTE — PROGRESS NOTES
Subjective:      Maritza Conroy is a 60 y.o. female is now 2 weeks status post conversion laparoscopic sleeve gastrectomy to gastric bypass. Doing well overall. She has lost a total of 13 pounds since surgery. Body mass index is 45.71 kg/m². Currently on a liquid diet without difficulty. Taking in 48-54oz water daily. Sources of protein include protein shakes. 60 min of activity 5 days a week, including walking. Bowel movements are regular. The patient is not having any pain. . The patient is compliant with multivitamins. Surgery related complications: NA.  Liver bx report reviewed with patient. Stomach bx report reviewed with patient. Weight Loss Metrics 9/8/2021 8/24/2021 8/9/2021 8/3/2021 6/9/2021 5/10/2021 8/28/2017   Today's Wt 241 lb 14.4 oz 254 lb 4.8 oz 253 lb 252 lb 254 lb 6.4 oz 247 lb 11.2 oz 275 lb 5.7 oz   BMI 45.71 kg/m2 48.05 kg/m2 47.8 kg/m2 47.61 kg/m2 48.07 kg/m2 48.38 kg/m2 53.78 kg/m2          Comorbidities:    Hypertension: improved, on Rx, denies lightheadedness or dizziness  Diabetes: resolved after sleeve gastrectomy  Obstructive Sleep Apnea: not applicable  Hyperlipidemia: unchanged, on Rx  Stress Urinary Incontinence: not applicable  Gastroesophageal Reflux: improved, on PPI  Weight related arthropathy:unchanged    Denies diagnosis of COVID-19 since surgery.      Patient Active Problem List   Diagnosis Code    Low back pain M54.5    Chest pain R07.9    Headache R51.9    Hypertension I10    High cholesterol E78.00    Heart failure (HCC) I50.9    Diabetes ([facility] [address] E11.9    Asthma J45.909    Migraines G43.909    GERD (gastroesophageal reflux disease) K21.9    Vitamin D deficiency E55.9    Moderate obesity E66.8    Morbid obesity with BMI of 45.0-49.9, adult ([facility]) E66.01, Z68.42    Intestinal malabsorption K90.9    S/P laparoscopic sleeve gastrectomy Z98.84    RLS (restless legs syndrome) G25.81    Morbid obesity ([facility]) E66.01    Morbid obesity with body mass index (BMI) of 40.0 to 49.9 ([facility]) E66.01    History of placement of internal cardiac defibrillator Z95.810        Past Medical History:   Diagnosis Date    Asthma     Cerebral aneurysm     <2mm, Dr Ray Stewart Chronic kidney disease     mild    Diabetes ([facility] [address]     resolved after sleeve gastrectomy, 9/2020    GERD (gastroesophageal reflux disease)     Heart failure ([facility] [address] 01/2016    EF 45-50% 10/20, Dr Marylen Lawless High cholesterol     History of placement of internal cardiac defibrillator     placed 2019 (had a simple pacemaker prior)    Hypertension     Intestinal malabsorption 5/10/2021    Low back pain     Migraines     Morbid obesity ([facility] [address]     Morbid obesity with body mass index (BMI) of 40.0 to 49.9 ([facility])     RLS (restless legs syndrome)     S/P laparoscopic sleeve gastrectomy 09/2020    [facility]    Vitamin D deficiency        Past Surgical History:   Procedure Laterality Date    HX CHOLECYSTECTOMY      HX LAP GASTRIC BYPASS  08/24/2021    Dr Belinda Jalloh, conversion from sleeve    HX ORTHOPAEDIC      ganglion cyst    HX PACEMAKER  2016 & 2019     [employer]    GA LAP, CLEMENCIA RESTRICT PROC, LONGITUDINAL GASTRECTOMY  09/2020    with hiatal hernia repair / [facility]ir[facility]       Current Outpatient Medications   Medication Sig Dispense Refill    B.infantis-B.ani-B.long-B.bifi (Probiotic 4X) 10-15 mg TbEC Take  by mouth.  scopolamine (TRANSDERM-SCOP) 1 mg over 3 days pt3d 1 Patch by TransDERmal route every seventy-two (72) hours. 1 Patch 0    montelukast (SINGULAIR) 10 mg tablet Take 10 mg by mouth nightly.  sacubitriL-valsartan (Entresto)  mg tablet Take 1 Tablet by mouth two (2) times a day.  cyanocobalamin, vitamin B-12, (VITAMIN B-12 PO) Take  by mouth.  MAGNESIUM PO Take  by mouth.  biotin 5 mg/mL liqd Take  by mouth.  multivitamin (ONE A DAY) tablet Take 1 Tablet by mouth daily.  pantoprazole (Protonix) 40 mg tablet Take 40 mg by mouth two (2) times a day.  ergocalciferol (ERGOCALCIFEROL) 50,000 unit capsule Take 1 Cap by mouth every seven (7) days. 4 Cap 3    rizatriptan (MAXALT) 10 mg tablet Take 10 mg by mouth once as needed for Migraine. May repeat in 2 hours if needed      carvedilol (COREG) 25 mg tablet Take 25 mg by mouth two (2) times daily (with meals).  atorvastatin (LIPITOR) 40 mg tablet Take 40 mg by mouth nightly.  fluticasone-salmeterol (ADVAIR DISKUS) 500-50 mcg/dose diskus inhaler Take 1 Puff by inhalation as needed.  albuterol (ACCUNEB) 1.25 mg/3 mL nebulizer solution Take 3 mL by inhalation every four (4) hours as needed for Wheezing (wheezing). 25 Each 0    albuterol (PROVENTIL, VENTOLIN) 90 mcg/actuation inhaler Take 1-2 Puffs by inhalation every four (4) hours as needed for Wheezing. 17 g 1    ipratropium (ATROVENT) 0.02 % nebulizer solution 2.5 mL by Nebulization route as needed for Wheezing. 2.5 mL 0    fluticasone (FLONASE) 50 mcg/actuation nasal spray 1 [place] by Both Nostrils route nightly.  Cetirizine (ZYRTEC) 10 mg cap Take 10 mg by mouth daily.  spironolactone (ALDACTONE) 25 mg tablet Take 50 mg by mouth two (2) times a day.  (Patient not taking: Reported on 9/8/2021)         No Known Allergies    Review of Symptoms:       General - No history or complaints of unexpected fever or chills  Head/Neck - No history or complaints of headache or dizziness  Cardiac - No history or complaints of chest pain, palpitations, or shortness of breath  Pulmonary - No history or complaints of shortness of breath or productive cough  Gastrointestinal - as noted above  Genitourinary - No history or complaints of hematuria/dysuria or renal lithiasis  Musculoskeletal - No history or complaints of joint  muscular weakness  Hematologic - No history of any bleeding episodes  Neurologic - No history or complaints of  migraine headaches or neurologic symptoms        Objective:     Visit Vitals  /62 (BP 1 Location: Left upper arm, BP Patient Position: Sitting, BP Cuff Size: Large adult)   Pulse 72   Temp 97.7 °F (36.5 °C)   Ht 5' 1\" (1.549 m)   Wt 109.7 kg (241 lb 14.4 oz)   SpO2 96%   BMI 45.71 kg/m²       General:  alert, cooperative, no distress, appears stated age   Chest: lungs clear to auscultation, breath sounds equal and symmetric, no rhonchi, rales or wheezes, no accessory muscle use   Cor:   Regular rate and rhythm, S1S2 present or without murmur or extra heart sounds   Abdomen: soft, bowel sounds active, non-tender, no masses or organomegaly   Incisions:   healing well, no drainage, no erythema, no hernia, no seroma, no swelling, no dehiscence, incision well approximated     A:  Stomach, cardia, biopsy:        No pathologic abnormality. B:  Liver, wedge biopsy:        Mild steatosis. Negative for active hepatitis, fibrosis, and hemosiderosis. Assessment:   History of Morbid obesity, status post laparoscopic gastric bypass surgery. Doing well postoperatively. Plan:     1. Increase activity to the goal of 30 minutes daily and Increase fluids  2. Advance diet to soft solid phase. Reminded to measure portions, continue high protein, low carbohydrate diet. Reminded to eat regularly, to eat slowly & not to drink with meals. Refer to the handbook given in class. 3. Continue multivitamin   4. Continue current medications and follow up with PCP for management of regimen. 5. Encouraged to attend support group   6. I have discussed this plan with patient and they verbalized understanding  7. Follow up in 2 weeks or sooner if patient has questions, concerns or worsening of condition, if unable to reach our office, patient should report to the ED. 8. Ms. Rachelle Ace has a reminder for a \"due or due soon\" health maintenance. I have asked that she contact her primary care provider for a follow-up on this health maintenance.

## 2021-09-28 ENCOUNTER — TELEPHONE (OUTPATIENT)
Dept: SURGERY | Age: 56
End: 2021-09-28

## 2021-09-28 ENCOUNTER — NURSE NAVIGATOR (OUTPATIENT)
Dept: SURGERY | Age: 56
End: 2021-09-28

## 2021-09-28 VITALS — BODY MASS INDEX: 43.23 KG/M2 | WEIGHT: 229 LBS | HEIGHT: 61 IN

## 2021-09-28 NOTE — TELEPHONE ENCOUNTER
Patient is currently on a soft food diet without difficulty. Patient is hydrating with   45-50 ounces, daily. Patient is tolerating the following sources of protein: ground beef, tuna fish, chicken, beans, fish, and one and half protein shakes daily. Patient does not currently have an exercise regimen. Patient has not had any readmissions, reoperations, complications, ED visits, nor IVF at [facility] during her first post-op month. Current weight: 229 lbs. Patient was reminded of the followin. Cholesectomy; no need for Ursodiol  2. Negative for H.pylori   3. Patient is cleared to return to work without restrictions. 4. Can stop probiotic    5. Advance diet to solid phase. Reminded to measure portions, continue high protein, low carbohydrate diet. Reminded to eat regularly, to eat slowly & not to drink with meals. Refer to the handbook and video. 6. Continue multi-vitamin with iron and add the additional vitamin supplementation (calcium citrate 1,500 mg per day taken one hour apart from your other vitamins/supplements, vitamin B complex one a day, vitamin B12 1,000 mcg daily taken sublingually, vitamin D3 3,000 IU per day, all listed in handbook)  7. Continue current medications and follow up with PCP for management of regimen  8. Continue cardio exercise and add resistance exercises. Discussed with patient. Minimum of 30 minutes of exercise daily, five days a week  9. Encouraged to attend support group   10. I have discussed this plan with patient, and they verbalized understanding. 11. Follow-up at three month appointment or sooner if patient has questions, concerns or worsening of condition. If unable to reach our office, patient should report to the ED. 12. One month nutrition video to include the above mentioned education e-mailed to patient. 13. Patient received the nutrition educational folder to include the above mentioned education during their two week post-op appointment.

## 2021-11-30 ENCOUNTER — OFFICE VISIT (OUTPATIENT)
Dept: SURGERY | Age: 56
End: 2021-11-30
Payer: MEDICARE

## 2021-11-30 VITALS
TEMPERATURE: 97.7 F | DIASTOLIC BLOOD PRESSURE: 63 MMHG | OXYGEN SATURATION: 99 % | HEART RATE: 60 BPM | WEIGHT: 220.5 LBS | HEIGHT: 61 IN | BODY MASS INDEX: 41.63 KG/M2 | SYSTOLIC BLOOD PRESSURE: 95 MMHG

## 2021-11-30 DIAGNOSIS — Z98.84 S/P BARIATRIC SURGERY: ICD-10-CM

## 2021-11-30 DIAGNOSIS — K90.9 INTESTINAL MALABSORPTION, UNSPECIFIED TYPE: Primary | ICD-10-CM

## 2021-11-30 DIAGNOSIS — I10 PRIMARY HYPERTENSION: ICD-10-CM

## 2021-11-30 DIAGNOSIS — K21.9 GASTROESOPHAGEAL REFLUX DISEASE, UNSPECIFIED WHETHER ESOPHAGITIS PRESENT: ICD-10-CM

## 2021-11-30 DIAGNOSIS — E55.9 VITAMIN D DEFICIENCY: ICD-10-CM

## 2021-11-30 PROCEDURE — G8754 DIAS BP LESS 90: HCPCS | Performed by: NURSE PRACTITIONER

## 2021-11-30 PROCEDURE — G8752 SYS BP LESS 140: HCPCS | Performed by: NURSE PRACTITIONER

## 2021-11-30 PROCEDURE — G8432 DEP SCR NOT DOC, RNG: HCPCS | Performed by: NURSE PRACTITIONER

## 2021-11-30 PROCEDURE — G8427 DOCREV CUR MEDS BY ELIG CLIN: HCPCS | Performed by: NURSE PRACTITIONER

## 2021-11-30 PROCEDURE — 99214 OFFICE O/P EST MOD 30 MIN: CPT | Performed by: NURSE PRACTITIONER

## 2021-11-30 PROCEDURE — 3017F COLORECTAL CA SCREEN DOC REV: CPT | Performed by: NURSE PRACTITIONER

## 2021-11-30 PROCEDURE — G8417 CALC BMI ABV UP PARAM F/U: HCPCS | Performed by: NURSE PRACTITIONER

## 2021-11-30 RX ORDER — UREA 10 %
100 LOTION (ML) TOPICAL DAILY
COMMUNITY

## 2021-11-30 RX ORDER — FUROSEMIDE 40 MG/1
TABLET ORAL DAILY
COMMUNITY

## 2021-11-30 NOTE — PATIENT INSTRUCTIONS
[employer] or My Fitness Pal    80-90g protein  800-1000 calories  Carbs below 50g                                                               Patient Instructions      1. Remember hydration goals - minimum of 64 ounces of liquids per day (dehydration is the number one reason for hospital readmission). 2. Continue to monitor carbohydrate and protein intake you need a minimum of  Grams of protein daily- remember to keep your total carbohydrates to 50 grams or less per day for best results. 3. Continue to work towards exercise goals - 60-90 minutes, 5 times a week minimum of deliberate, aerobic exercise is the ultimate goal with strength training 2 times each week. Refer to [employer] for  information. 4. Remember to take vitamins as directed. 5. Attend support group the 2nd Thursday of each month. 6.  Constipation: Milk of Magnesia is for immediate relief only. Miralax is to be used every day if constipation is a chronic problem. 7.  Diarrhea: patients will occasionally develop lactose intolerance after surgery. Check to see if your protein shake has whey in it. If it does try a protein powder or drink that does not have whey and stop all yogurts, cheeses and milks to see if the diarrhea goes away. 8.  If you have had labs drawn. We will only call you if you have abnormal results. Otherwise you can access the lab results in \"mychart\". You will only need the access code the first time you sign on. 9.  Call us at [phone] or email us through [employer] with questions,     concerns or worsening of condition, we have someone on call 24 hours a day. If you are unable to reach our office, you are to go to your Primary Care Physician or the Emergency Department.      NOTE TO GASTRIC BYPASS PATIENTS:  (SAME APPLIES TO GASTRIC SLEEVE PATIENTS FOR FIRST TWO MONTHS)  Remember that for the rest of your life, you are not able to take the following:  - NSAIDs (ibuprofen, goody powder, BC powder, Motrin, Advil, Mobic, Voltaren, Excedrin, etc.)  - Steroid pills or injections  - Smoke (cigarettes or recreational drugs)  - Alcohol  Use of any of the above may cause ulcers in your stomach which may perforate causing a medical emergency and surgery. Speak to our medical staff if another medical provider requires you to take steroids or NSAIDs. Supplement Resource Guide    Importance of Protein:   Maintains lean body mass, produces antibodies to fight off infections, heals wounds, minimizes hair loss, helps to give you energy, helps with satiety, and keeping you full between meals. Importance of Calcium:  Needed for healthy bones and teeth, normal blood clotting, and nervous system functioning, higher risk of osteoporosis and bone disease with non-compliance. Importance of Multivitamins: Many functions. Supply you with extra nutrients that you may be missing from food. May lead to iron deficiency anemia, weakness, fatigue, and many other symptoms with non-compliance. Importance of B Vitamins:  Important for red blood cell formation, metabolism, energy, and helps to maintain a healthy nervous system. Protein Supplement  Find one you like now. Use immediately after surgery. Look for:  35-50g protein each day from your protein supplement once you reach the progression diet. 0-3 g fat per serving  0-3 g sugar per serving    Protein drinks should be split in separate dosages. Recommend: Lifelong  1 year + Calcium Supplement:     Start taking within a month after surgery. Look for: Calcium Citrate Plus D (1500 mg per day)  Recommend: Citracal     .            Avoid chocolate chewable calcium. Can use chewable bariatric or [employer] brand or similar chewable. The body cannot absorb more than 500-600 mg of calcium at a time.       Take for Life Multi-vitamin Supplement:      Start immediately after surgery: any complete chewable, such as: [employer]s Complete chewables. Avoid [place] sours or gummies. They lack iron and other important nutrients and also have added sugar. Continue with chewable vitamin or change to adult complete multivitamin one month after surgery. Menstruating women can take a prenatal vitamin. Make sure has at least 18 mg iron and 769-132 mcg folic acid   Vitamin P61, B Complex Vitamin, and Biotin  Start taking within a month after surgery. Vitamin B12:  1000 mcg of Vitamin B12 three times weekly    Must take sublingually (meaning you take it under your tongue) or in a liquid drop form for easy absorption. B Complex Vitamin: Take a pill or liquid drop form once daily. Biotin: This vitamin can help prevent hair loss. Recommend 5mg   (5000 mcg) a day  Biotin is Optional              Learning About Being Physically Active  What is physical activity? Being physically active means doing any kind of activity that gets your body moving. The types of physical activity that can help you get fit and stay healthy include:  · Aerobic or \"cardio\" activities. These make your heart beat faster and make you breathe harder, such as brisk walking, riding a bike, or running. They strengthen your heart and lungs and build up your endurance. · Strength training activities. These make your muscles work against, or \"resist,\" something. Examples include lifting weights or doing push-ups. These activities help tone and strengthen your muscles and bones. · Stretches. These let you move your joints and muscles through their full range of motion. Stretching helps you be more flexible. What are the benefits of being active? Being active is one of the best things you can do for your health. It helps you to:  · Feel stronger and have more energy to do all the things you like to do. · Focus better at school or work. · Feel, think, and sleep better. · Reach and stay at a healthy weight. · Lose fat and build lean muscle.   · Lower your risk for serious health problems, including diabetes, heart attack, high blood pressure, and some cancers. · Keep your heart, lungs, bones, muscles, and joints strong and healthy. How can you make being active part of your life? Start slowly. Make it your long-term goal to get at least 30 minutes of exercise on most days of the week. Walking is a good choice. You also may want to do other activities, such as running, swimming, cycling, or playing tennis or team sports. Pick activities that you like--ones that make your heart beat faster, your muscles stronger, and your muscles and joints more flexible. If you find more than one thing you like doing, do them all. You don't have to do the same thing every day. Get your heart pumping every day. Any activity that makes your heart beat faster and keeps it at that rate for a while counts. Here are some great ways to get your heart beating faster:  · Go for a brisk walk, run, or bike ride. · Go for a hike or swim. · Go in-line skating. · Play a game of touch football, basketball, or soccer. · Ride a bike. · Play tennis or racquetball. · Climb stairs. Even some household chores can be aerobic--just do them at a faster pace. Vacuuming, raking or mowing the lawn, sweeping the garage, and washing and waxing the car all can help get your heart rate up. Strengthen your muscles during the week. You don't have to lift heavy weights or grow big, bulky muscles to get stronger. Doing a few simple activities that make your muscles work against, or \"resist,\" something can help you get stronger. For example, you can:  · Do push-ups or sit-ups, which use your own body weight as resistance. · Lift weights or dumbbells or use stretch bands at home or in a gym or community center. Stretch your muscles often. Stretching will help you as you become more active. It can help you stay flexible, loosen tight muscles, and avoid injury.  It can also help improve your balance and posture and can be a great way to relax. Be sure to stretch the muscles you'll be using when you work out. It's best to warm your muscles slightly before you stretch them. Walk or do some other light aerobic activity for a few minutes, and then start stretching. When you stretch your muscles:  · Do it slowly. Stretching is not about going fast or making sudden movements. · Don't push or bounce during a stretch. · Hold each stretch for at least 15 to 30 seconds, if you can. You should feel a stretch in the muscle, but not pain. · Breathe out as you do the stretch. Then breathe in as you hold the stretch. Don't hold your breath. If you're worried about how more activity might affect your health, have a checkup before you start. Follow any special advice your doctor gives you for getting a smart start. Where can you learn more? Go to http://www.gray.com/  Enter [id number] in the search box to learn more about \"Learning About Being Physically Active. \"  Current as of: May 12, 2021               Content Version: 13.0  © 2006-2021 [employer]. Care instructions adapted under license by [employer] (which disclaims liability or warranty for this information). If you have questions about a medical condition or this instruction, always ask your healthcare professional. [address] any warranty or liability for your use of this information.

## 2021-11-30 NOTE — PROGRESS NOTES
Subjective:      Noah Christopher is a 60 y.o. female is now 3 months status post conversion laparoscopic sleeve gastrectomy to gastric bypass. Doing well overall. She has lost a total of 34 pounds since surgery. Body mass index is 41.66 kg/m². Has lost 26% of EBW. Currently on a solid food diet without difficulty, reports no real issues and denies vomiting, abdominal pain, diarrhea, nausea and reflux. The patients diet choices have been reviewed today and counseling was given. Fluid intake: good, 60 oz      Protein intake: 1 shake + food; eggs, tuna, cheese, nuts      Meals/day: 2 + snacks     Increasing activity, but no structured exercise, walking 2-3 times per week. Bowel movements are regular. The patient is not having any pain. . The patient is compliant with multivitamins, calcium, Vit D and B12 supplements.      Weight Loss Metrics 11/30/2021 9/28/2021 9/8/2021 8/24/2021 8/9/2021 8/3/2021 6/9/2021   Today's Wt 220 lb 8 oz 229 lb 241 lb 14.4 oz 254 lb 4.8 oz 253 lb 252 lb 254 lb 6.4 oz   BMI 41.66 kg/m2 43.27 kg/m2 45.71 kg/m2 48.05 kg/m2 47.8 kg/m2 47.61 kg/m2 48.07 kg/m2          Comorbidities:    Hypertension: improved, on decreased Rx, denies lightheadedness or dizziness  Diabetes: resolved after sleeve gastrectomy  Obstructive Sleep Apnea: not applicable  Hyperlipidemia: unchanged, on Rx  Stress Urinary Incontinence: not applicable  Gastroesophageal Reflux: improved, on PPI  Weight related arthropathy:unchanged    Patient Active Problem List   Diagnosis Code    Low back pain M54.50    Chest pain R07.9    Headache R51.9    Hypertension I10    High cholesterol E78.00    Heart failure ([facility]) I50.9    Diabetes ([facility] [address] E11.9    Asthma J45.909    Migraines G43.909    GERD (gastroesophageal reflux disease) K21.9    Vitamin D deficiency E55.9    Moderate obesity E66.8    Morbid obesity with BMI of 45.0-49.9, adult (HCC) E66.01, Z68.42    Intestinal malabsorption K90.9    S/P laparoscopic sleeve gastrectomy Z98.84    RLS (restless legs syndrome) G25.81    Morbid obesity (HCC) E66.01    Morbid obesity with body mass index (BMI) of 40.0 to 49.9 (HCC) E66.01    History of placement of internal cardiac defibrillator Z95.810        Past Medical History:   Diagnosis Date    Asthma     Cerebral aneurysm     <2mm, Dr Sin Pettit Chronic kidney disease     mild    Diabetes ([facility] [address]     resolved after sleeve gastrectomy, 9/2020    GERD (gastroesophageal reflux disease)     Heart failure ([facility] [address] 01/2016    EF 45-50% 10/20, Dr Robert Torres High cholesterol     History of placement of internal cardiac defibrillator     placed 2019 (had a simple pacemaker prior)    Hypertension     Intestinal malabsorption 5/10/2021    Low back pain     Migraines     Morbid obesity ([facility] [address]     Morbid obesity with body mass index (BMI) of 40.0 to 49.9 (HCC)     RLS (restless legs syndrome)     S/P laparoscopic sleeve gastrectomy 09/2020    Niranjan    Vitamin D deficiency        Past Surgical History:   Procedure Laterality Date    HX CHOLECYSTECTOMY      HX LAP GASTRIC BYPASS  08/24/2021    Dr Helton Hence, conversion from sleeve    HX ORTHOPAEDIC      ganglion cyst    HX PACEMAKER  2016 & 2019     [employer]    ID LAP, CLEMENCIA RESTRICT PROC, LONGITUDINAL GASTRECTOMY  09/2020    with hiatal hernia repair / Niranjan       Current Outpatient Medications   Medication Sig Dispense Refill    cyanocobalamin (Vitamin B-12) 100 mcg tablet Take 100 mcg by mouth daily.  furosemide (LASIX) 40 mg tablet Take  by mouth daily.  B.infantis-B.ani-B.long-B.bifi (Probiotic 4X) 10-15 mg TbEC Take  by mouth.  montelukast (SINGULAIR) 10 mg tablet Take 10 mg by mouth nightly.  sacubitriL-valsartan (Entresto)  mg tablet Take 1 Tablet by mouth two (2) times a day.  cyanocobalamin, vitamin B-12, (VITAMIN B-12 PO) Take  by mouth.  MAGNESIUM PO Take  by mouth.       biotin 5 mg/mL liqd Take by mouth.  multivitamin (ONE A DAY) tablet Take 1 Tablet by mouth daily.  pantoprazole (Protonix) 40 mg tablet Take 40 mg by mouth two (2) times a day.  ergocalciferol (ERGOCALCIFEROL) 50,000 unit capsule Take 1 Cap by mouth every seven (7) days. 4 Cap 3    rizatriptan (MAXALT) 10 mg tablet Take 10 mg by mouth once as needed for Migraine. May repeat in 2 hours if needed      carvedilol (COREG) 25 mg tablet Take 25 mg by mouth two (2) times daily (with meals).  atorvastatin (LIPITOR) 40 mg tablet Take 40 mg by mouth nightly.  fluticasone-salmeterol (ADVAIR DISKUS) 500-50 mcg/dose diskus inhaler Take 1 Puff by inhalation as needed.  albuterol (ACCUNEB) 1.25 mg/3 mL nebulizer solution Take 3 mL by inhalation every four (4) hours as needed for Wheezing (wheezing). 25 Each 0    albuterol (PROVENTIL, VENTOLIN) 90 mcg/actuation inhaler Take 1-2 Puffs by inhalation every four (4) hours as needed for Wheezing. 17 g 1    ipratropium (ATROVENT) 0.02 % nebulizer solution 2.5 mL by Nebulization route as needed for Wheezing. 2.5 mL 0    fluticasone (FLONASE) 50 mcg/actuation nasal spray 1 [place] by Both Nostrils route nightly.  Cetirizine (ZYRTEC) 10 mg cap Take 10 mg by mouth daily.          No Known Allergies    Review of Symptoms:       General - No history or complaints of unexpected fever or chills  Head/Neck - No history or complaints of headache or dizziness  Cardiac - No history or complaints of chest pain, palpitations, or shortness of breath  Pulmonary - No history or complaints of shortness of breath or productive cough  Gastrointestinal - as noted above  Genitourinary - No history or complaints of hematuria/dysuria or renal lithiasis  Musculoskeletal - No history or complaints of joint  muscular weakness  Hematologic - No history of any bleeding episodes  Neurologic - No history or complaints of  migraine headaches or neurologic symptoms        Objective:     Visit Vitals  BP 95/63 (BP 1 Location: Left upper arm, BP Patient Position: Sitting, BP Cuff Size: Large adult)   Pulse 60   Temp 97.7 °F (36.5 °C)   Ht 5' 1\" (1.549 m)   Wt 100 kg (220 lb 8 oz)   SpO2 99%   BMI 41.66 kg/m²       Physical Examination:     General appearance:  alert, cooperative, no distress, appears stated age   Mental status   alert, oriented to person, place, and time   Neck  supple, no significant adenopathy     Lymphatics  no palpable lymphadenopathy, no hepatosplenomegaly   Chest  clear to auscultation, no wheezes, rales or rhonchi, symmetric air entry   Heart  normal rate, regular rhythm, normal S1, S2, no murmurs, rubs, clicks or gallops    Abdomen: soft, nontender, nondistended, no masses or organomegaly   Incision:  Well healed, no hernias      Neurological  alert, oriented, normal speech, no focal findings or movement disorder noted   Musculoskeletal no joint tenderness, deformity or swelling   Extremities peripheral pulses normal, no pedal edema, no clubbing or cyanosis   Skin normal coloration and turgor, no rashes, no suspicious skin lesions noted        Assessment and Plan:   1. Intestinal malabsorption  a. continue required Vitamins: B12, B complex, D, iron, calcium, multivitamin  2. S/p laparoscopic bariatric surgery,laparoscopic gastric bypass surgery , history of morbid obesity. Reviewed weight loss progress and recommend using food tracking bharat to ensure adequate protein and caloric intake. Aim for 80-90 g protein daily and 800-1000 calories. Keep daily carbs below 50g. Needs to increase exercise. a. Sleep goal is 7-9 hours each night. Patient education given on the effects of sleep deprivation on weight control. b. Discussed patients weight loss goals and dietary choices in relation to goals. c. Reminded to measure portions, continue high protein, low carbohydrate diet. Reminded to eat regularly, to eat slowly & not to drink with meals.     d. Continue cardio exercise and add resistance exercises. 60-90 minutes of aerobic activity 5 days a week and strength training 2 days each week. e. Encouraged to attend support group   f. Required fluid intake is >64oz daily of decaffeinated sugar free beverages. 3. GERD - did discuss tapering off PPI and to monitor for symptoms    Patient to complete labs before next visit. Lab slip given today. I have discussed this plan with patient and they verbalized understanding    30 minutes spent with patient. Follow up in 3 months or sooner if patient has questions, concerns or worsening of condition, if unable to reach our office, patient should report to the ED. Ms. Yevgeniy Jaffe has a reminder for a \"due or due soon\" health maintenance. I have asked that she contact her primary care provider for a follow-up on this health maintenance.

## 2022-07-05 ENCOUNTER — DOCUMENTATION ONLY (OUTPATIENT)
Dept: SURGERY | Age: 57
End: 2022-07-05

## (undated) DEVICE — RESERVOIR,SUCTION,100CC,SILICONE: Brand: MEDLINE

## (undated) DEVICE — SUTURE PDS II SZ 0 L27IN ABSRB VLT L26MM CT-2 1/2 CIR Z334H

## (undated) DEVICE — GARMENT,MEDLINE,DVT,INT,CALF,MED, GEN2: Brand: MEDLINE

## (undated) DEVICE — APPLICATOR LAP 35 CM 2 RIGID VISTASEAL

## (undated) DEVICE — TUBING, SUCTION, 1/4" X 12', STRAIGHT: Brand: MEDLINE

## (undated) DEVICE — Device

## (undated) DEVICE — NEEDLE INSUF L150MM DIA2MM DISP FOR PNEUMOPERI ENDOPATH

## (undated) DEVICE — FORCEPS BX OVL CUP SERR DISP CAP L 240CM RAD JAW 4

## (undated) DEVICE — VISUALIZATION SYSTEM: Brand: CLEARIFY

## (undated) DEVICE — CUTTER ENDOSCP L340MM LIN ARTC SGL STROKE FIRING ENDOPATH

## (undated) DEVICE — SUTURE ETHLN SZ 3-0 L30IN NONABSORBABLE BLK FSL L30MM 3/8 1671H

## (undated) DEVICE — SOL IRRIGATION INJ NACL 0.9% 500ML BTL

## (undated) DEVICE — ENDOCUT SCISSOR TIP, DISPOSABLE: Brand: RENEW

## (undated) DEVICE — RELOAD STPL H1.8-3.8MM REG THCK TISS G 6 ROW GRIPPING SURF

## (undated) DEVICE — SHEAR HARMONIC 5MMX45CM -- ACE 7+

## (undated) DEVICE — TISSUE RETRIEVAL SYSTEM: Brand: INZII RETRIEVAL SYSTEM

## (undated) DEVICE — TROCAR ENDOSCP L100MM DIA12MM STBL SL BLDELSS ENDOPATH XCEL

## (undated) DEVICE — STAPLER SKIN L440MM 32MM LNG 12 FIRING B FRM PWR + GRIPPING

## (undated) DEVICE — TOTAL TRAY, 16FR 10ML SIL FOLEY, URN: Brand: MEDLINE

## (undated) DEVICE — SLEEVE TRCR L100MM DIA5MM UNIV STBL FOR BLDELSS DIL TIP

## (undated) DEVICE — DISPOSABLE SUCTION/IRRIGATOR TUBE SET WITH TIP: Brand: AHTO

## (undated) DEVICE — RELOAD STPL H4.1X2MM DIA60MM THCK TISS GRN 6 ROW PWR GST B

## (undated) DEVICE — TROCAR ENDOSCP BLDELSS 12X100 MM W/ HNDL STBL SL OPT TIP

## (undated) DEVICE — AGENT HEMSTAT W6XL9IN OXIDIZED REGENERATED CELOS ABSRB FOR

## (undated) DEVICE — SOLUTION SURG PREP 26 CC PURPREP

## (undated) DEVICE — RELOAD STPL L60MM H1-2.6MM MESENTERY THN TISS WHT 6 ROW

## (undated) DEVICE — SOLUTION LACTATED RINGERS INJECTION USP

## (undated) DEVICE — [HIGH FLOW INSUFFLATOR,  DO NOT USE IF PACKAGE IS DAMAGED,  KEEP DRY,  KEEP AWAY FROM SUNLIGHT,  PROTECT FROM HEAT AND RADIOACTIVE SOURCES.]: Brand: PNEUMOSURE

## (undated) DEVICE — SEALANT TISS 10 CC FOR HUM FIBRIN VISTASEAL

## (undated) DEVICE — STAPLER INT L37CM STPL 21MM CIR ENDOSCP CRV INTLUMN B FRM

## (undated) DEVICE — TROCAR ENDOSCP L100MM DIA15MM BLDELSS STBL SL ENDOPATH XCEL

## (undated) DEVICE — SUTURE ETHIB EXCL BR GRN TAPR PT 2-0 30 X563H X563H

## (undated) DEVICE — RELOAD STPL H1-2.5X45MM VASC THN TISS WHT 6 ROW B FRM SGL

## (undated) DEVICE — GLOVE ORANGE PI 7   MSG9070

## (undated) DEVICE — SUT MONOCRYL PLUS UD 4-0 --

## (undated) DEVICE — SUT ETHLN 3-0 18IN PS1 BLK --

## (undated) DEVICE — CLIP SUT ENDOSCP F/2-0/3-0/4-0 -- LAPRA-TY

## (undated) DEVICE — APPLIER CLP L SHFT DIA12MM 20 ROT MULT LIGACLP

## (undated) DEVICE — TROCAR LAP L100MM DIA5MM BLDELSS W/ STBL SL ENDOPATH XCEL

## (undated) DEVICE — SUT PROL 2-0 30IN CT2 BLU --

## (undated) DEVICE — BARIATRIC: Brand: MEDLINE INDUSTRIES, INC.